# Patient Record
Sex: MALE | Race: ASIAN | NOT HISPANIC OR LATINO | URBAN - METROPOLITAN AREA
[De-identification: names, ages, dates, MRNs, and addresses within clinical notes are randomized per-mention and may not be internally consistent; named-entity substitution may affect disease eponyms.]

---
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
## 2022-06-28 ENCOUNTER — INPATIENT (INPATIENT)
Facility: HOSPITAL | Age: 22
LOS: 6 days | Discharge: HOME | End: 2022-07-05
Attending: SURGERY | Admitting: SURGERY

## 2022-06-28 VITALS
TEMPERATURE: 102 F | DIASTOLIC BLOOD PRESSURE: 68 MMHG | SYSTOLIC BLOOD PRESSURE: 115 MMHG | HEART RATE: 115 BPM | RESPIRATION RATE: 18 BRPM | OXYGEN SATURATION: 98 %

## 2022-06-28 LAB
ALBUMIN SERPL ELPH-MCNC: 4 G/DL — SIGNIFICANT CHANGE UP (ref 3.3–5)
ALP SERPL-CCNC: 42 U/L — SIGNIFICANT CHANGE UP (ref 40–120)
ALT FLD-CCNC: 30 U/L — SIGNIFICANT CHANGE UP (ref 4–41)
ANION GAP SERPL CALC-SCNC: 13 MMOL/L — SIGNIFICANT CHANGE UP (ref 7–14)
AST SERPL-CCNC: 62 U/L — HIGH (ref 4–40)
BASE EXCESS BLDV CALC-SCNC: -0.7 MMOL/L — SIGNIFICANT CHANGE UP (ref -2–3)
BASOPHILS # BLD AUTO: 0.03 K/UL — SIGNIFICANT CHANGE UP (ref 0–0.2)
BASOPHILS NFR BLD AUTO: 0.2 % — SIGNIFICANT CHANGE UP (ref 0–2)
BILIRUB SERPL-MCNC: 1.6 MG/DL — HIGH (ref 0.2–1.2)
BLOOD GAS VENOUS COMPREHENSIVE RESULT: SIGNIFICANT CHANGE UP
BUN SERPL-MCNC: 15 MG/DL — SIGNIFICANT CHANGE UP (ref 7–23)
CALCIUM SERPL-MCNC: 9.1 MG/DL — SIGNIFICANT CHANGE UP (ref 8.4–10.5)
CHLORIDE BLDV-SCNC: 98 MMOL/L — SIGNIFICANT CHANGE UP (ref 96–108)
CHLORIDE SERPL-SCNC: 95 MMOL/L — LOW (ref 98–107)
CO2 BLDV-SCNC: 24.5 MMOL/L — SIGNIFICANT CHANGE UP (ref 22–26)
CO2 SERPL-SCNC: 23 MMOL/L — SIGNIFICANT CHANGE UP (ref 22–31)
CREAT SERPL-MCNC: 1.48 MG/DL — HIGH (ref 0.5–1.3)
EGFR: 69 ML/MIN/1.73M2 — SIGNIFICANT CHANGE UP
EOSINOPHIL # BLD AUTO: 0 K/UL — SIGNIFICANT CHANGE UP (ref 0–0.5)
EOSINOPHIL NFR BLD AUTO: 0 % — SIGNIFICANT CHANGE UP (ref 0–6)
GAS PNL BLDV: 127 MMOL/L — LOW (ref 136–145)
GLUCOSE BLDV-MCNC: 105 MG/DL — HIGH (ref 70–99)
GLUCOSE SERPL-MCNC: 109 MG/DL — HIGH (ref 70–99)
HCO3 BLDV-SCNC: 23 MMOL/L — SIGNIFICANT CHANGE UP (ref 22–29)
HCT VFR BLD CALC: 41.4 % — SIGNIFICANT CHANGE UP (ref 39–50)
HCT VFR BLDA CALC: 41 % — SIGNIFICANT CHANGE UP (ref 39–51)
HGB BLD CALC-MCNC: 13.7 G/DL — SIGNIFICANT CHANGE UP (ref 13–17)
HGB BLD-MCNC: 14 G/DL — SIGNIFICANT CHANGE UP (ref 13–17)
IANC: 10.25 K/UL — HIGH (ref 1.8–7.4)
IMM GRANULOCYTES NFR BLD AUTO: 0.6 % — SIGNIFICANT CHANGE UP (ref 0–1.5)
LACTATE BLDV-MCNC: 0.8 MMOL/L — SIGNIFICANT CHANGE UP (ref 0.5–2)
LIDOCAIN IGE QN: 15 U/L — SIGNIFICANT CHANGE UP (ref 7–60)
LYMPHOCYTES # BLD AUTO: 1.01 K/UL — SIGNIFICANT CHANGE UP (ref 1–3.3)
LYMPHOCYTES # BLD AUTO: 7.9 % — LOW (ref 13–44)
MAGNESIUM SERPL-MCNC: 1.8 MG/DL — SIGNIFICANT CHANGE UP (ref 1.6–2.6)
MCHC RBC-ENTMCNC: 28.1 PG — SIGNIFICANT CHANGE UP (ref 27–34)
MCHC RBC-ENTMCNC: 33.8 GM/DL — SIGNIFICANT CHANGE UP (ref 32–36)
MCV RBC AUTO: 83 FL — SIGNIFICANT CHANGE UP (ref 80–100)
MONOCYTES # BLD AUTO: 1.39 K/UL — HIGH (ref 0–0.9)
MONOCYTES NFR BLD AUTO: 10.9 % — SIGNIFICANT CHANGE UP (ref 2–14)
NEUTROPHILS # BLD AUTO: 10.25 K/UL — HIGH (ref 1.8–7.4)
NEUTROPHILS NFR BLD AUTO: 80.4 % — HIGH (ref 43–77)
NRBC # BLD: 0 /100 WBCS — SIGNIFICANT CHANGE UP
NRBC # FLD: 0 K/UL — SIGNIFICANT CHANGE UP
PCO2 BLDV: 36 MMHG — LOW (ref 42–55)
PH BLDV: 7.42 — SIGNIFICANT CHANGE UP (ref 7.32–7.43)
PHOSPHATE SERPL-MCNC: 2.1 MG/DL — LOW (ref 2.5–4.5)
PLATELET # BLD AUTO: 190 K/UL — SIGNIFICANT CHANGE UP (ref 150–400)
PO2 BLDV: 56 MMHG — SIGNIFICANT CHANGE UP
POTASSIUM BLDV-SCNC: 3.9 MMOL/L — SIGNIFICANT CHANGE UP (ref 3.5–5.1)
POTASSIUM SERPL-MCNC: 4.1 MMOL/L — SIGNIFICANT CHANGE UP (ref 3.5–5.3)
POTASSIUM SERPL-SCNC: 4.1 MMOL/L — SIGNIFICANT CHANGE UP (ref 3.5–5.3)
PROT SERPL-MCNC: 7.6 G/DL — SIGNIFICANT CHANGE UP (ref 6–8.3)
RBC # BLD: 4.99 M/UL — SIGNIFICANT CHANGE UP (ref 4.2–5.8)
RBC # FLD: 13.3 % — SIGNIFICANT CHANGE UP (ref 10.3–14.5)
SAO2 % BLDV: 89.3 % — SIGNIFICANT CHANGE UP
SODIUM SERPL-SCNC: 131 MMOL/L — LOW (ref 135–145)
WBC # BLD: 12.76 K/UL — HIGH (ref 3.8–10.5)
WBC # FLD AUTO: 12.76 K/UL — HIGH (ref 3.8–10.5)

## 2022-06-28 PROCEDURE — 99285 EMERGENCY DEPT VISIT HI MDM: CPT

## 2022-06-28 PROCEDURE — 71045 X-RAY EXAM CHEST 1 VIEW: CPT | Mod: 26

## 2022-06-28 RX ORDER — ACETAMINOPHEN 500 MG
1000 TABLET ORAL ONCE
Refills: 0 | Status: COMPLETED | OUTPATIENT
Start: 2022-06-28 | End: 2022-06-28

## 2022-06-28 RX ORDER — SODIUM CHLORIDE 9 MG/ML
1000 INJECTION, SOLUTION INTRAVENOUS ONCE
Refills: 0 | Status: COMPLETED | OUTPATIENT
Start: 2022-06-28 | End: 2022-06-28

## 2022-06-28 RX ADMIN — SODIUM CHLORIDE 1000 MILLILITER(S): 9 INJECTION, SOLUTION INTRAVENOUS at 22:45

## 2022-06-28 RX ADMIN — Medication 400 MILLIGRAM(S): at 22:45

## 2022-06-28 NOTE — ED PROVIDER NOTE - CLINICAL SUMMARY MEDICAL DECISION MAKING FREE TEXT BOX
22M w/ no PMHx presents with 2 days of abd pain, N/V, fever/chills. Patient was weight lifting (315lbs bench press) 3 days ago when the weight breifly fell onto his upper abd for a matter of seconds prior to having help lifting the weight off. Patient had epigastric pain after leaving the gym but thought he might just be hungry so he ate and went to bed. The next AM, his pain was worsened, accompanied by N/V, chills. Patient apparently went white water rafting yesterday despite his symptoms, and today woke up feeling worse abd pain with additional episodes of N/V, fever/chills. Denies hematemesis, CP, SOB, back pain, tingling/numbness. Febrile, tachycardic, abd soft, nondistended, +diffuse abd tenderness (lower>upper), no rebound tenderness, no guarding, no rigidity. Possible abd wall trauma given history, however, patient febrile and abd exam with diffuse tenderness, so will rule out acute surgical abdomen/sepsis. Will get labs, CT, give meds, and reassess.

## 2022-06-28 NOTE — ED PROVIDER NOTE - OBJECTIVE STATEMENT
22M w/ no PMHx presents with 2 days of abd pain, N/V, fever/chills. Patient was weight lifting (315lbs bench press) 3 days ago when the weight breifly fell onto his upper abd for a matter of seconds prior to having help lifting the weight off. Patient had epigastric pain after leaving the gym but thought he might just be hungry so he ate and went to bed. The next AM, his pain was worsened, accompanied by N/V, chills. Patient apparently went white water rafting yesterday despite his symptoms, and today woke up feeling worse abd pain with additional episodes of N/V, fever/chills. Denies hematemesis, CP, SOB, back pain, tingling/numbness.

## 2022-06-28 NOTE — ED ADULT TRIAGE NOTE - CHIEF COMPLAINT QUOTE
p/.t c/o of lower abd pain with nausea and vomiting and dysuria for few days, denies sick contact denies chest pain or sob

## 2022-06-28 NOTE — ED PROVIDER NOTE - ATTENDING CONTRIBUTION TO CARE
Patient is a 23 yo M with no chronic medical problems here for evaluation of abdominal pain, vomiting and now fever. Patient reports he was lifting weights at a gym on Sunday when the 300 pound barbell he was lifting slipped onto his abdomen. He had brought it down to his chest but it slipped to his upper abdomen. He did have some pain. He reports he went home and was able to eat but the following day he continued to have pain. He thought it might be something he ate and induced vomiting but then continued to vomit throughout the day. He went to the [facility] with his family and friends yesterday and felt ill while there. He states he was shivering, continued to vomit. Upon coming to the ED today, he found out he had a fever. He also developed a cough in the past few days. He reports he is vaccinated for covid x 3. No diarrhea. No dysuria. No chest pain or shortness of breath.     VS noted  Gen. no acute distress, Non toxic   HEENT: EOMI, mmm   Lungs: CTAB/L no C/ W /R   CVS: RRR   Abd; Soft, mild ttp to RLQ, no guarding or rebound, no skin changes.   Ext: no edema  Skin: no rash  Neuro AAOx3 non focal clear speech  a/p: fever, vomiting, abd pain - concern for viral etiology, possibly appendicitis. Given vomiting, hx of barbell onto abdomen - will check CT A/P with IV contrast, labs, u/a/ flu/ covid and reasses.   - Celina WILLIS

## 2022-06-28 NOTE — ED PROVIDER NOTE - PHYSICAL EXAMINATION
GENERAL: well appearing in no acute distress, non-toxic appearing  HEAD: normocephalic, atraumatic  HEENT: normal conjunctiva, oral mucosa moist, uvula midline, no tonsilar exudates, no JVD  CARDIAC: regular rate and rhythm, normal S1S2, no appreciable murmurs, 2+ pulses in UE/LE b/l  PULM: normal breath sounds, clear to ascultation bilaterally, no rales, rhonchi, wheezing  GI: Abd soft, nondistended, +diffuse abd tenderness (lower abd >upper abd), no rebound tenderness, no guarding, no rigidity  : no CVA tenderness b/l, no suprapubic tenderness  NEURO: no focal motor or sensory deficits, normal speech, normal gait, AAOx3  MSK: ROM intact, no peripheral edema, no calf tenderness b/l  SKIN: well-perfused, extremities warm, no visible rashes  PSYCH: appropriate mood and affect

## 2022-06-28 NOTE — ED ADULT NURSE NOTE - NS ED NURSE PATIENT LEFT UNIT TIME
Problem: Skin Injury Risk Increased  Goal: Skin Health and Integrity  Outcome: Ongoing, Not Progressing  Intervention: Optimize Skin Protection  Flowsheets (Taken 5/19/2022 1749)  Pressure Reduction Techniques: frequent weight shift encouraged  Pressure Reduction Devices: positioning supports utilized  Skin Protection: incontinence pads utilized  Intervention: Promote and Optimize Oral Intake  Flowsheets (Taken 5/19/2022 1749)  Oral Nutrition Promotion:   medicated   physical activity promoted  Plan of care reviewed with patient. Patients status ongoing progressing.      08:15

## 2022-06-28 NOTE — ED ADULT NURSE NOTE - OBJECTIVE STATEMENT
Break covering RN: patient arrives to the ER A&Ox4, ambulatory, c/o right lower abdominal pain that began on monday while he was on family vacation in the [facility]. patient states he returned today to come to the ER. patient states pain is severe and he was vomiting on monday however has not since. patient is well appearing, all safety maintained at this time, abdomen soft and nondistended. patient denies any PMH. 20G IV placed in L AC, labs sent, medication given and tolerated well per EMAR, all safety maintained.

## 2022-06-29 ENCOUNTER — TRANSCRIPTION ENCOUNTER (OUTPATIENT)
Age: 22
End: 2022-06-29

## 2022-06-29 DIAGNOSIS — K35.80 UNSPECIFIED ACUTE APPENDICITIS: ICD-10-CM

## 2022-06-29 LAB
ANION GAP SERPL CALC-SCNC: 11 MMOL/L — SIGNIFICANT CHANGE UP (ref 7–14)
ANION GAP SERPL CALC-SCNC: 11 MMOL/L — SIGNIFICANT CHANGE UP (ref 7–14)
APPEARANCE UR: CLEAR — SIGNIFICANT CHANGE UP
APTT BLD: 30.9 SEC — SIGNIFICANT CHANGE UP (ref 27–36.3)
BACTERIA # UR AUTO: NEGATIVE — SIGNIFICANT CHANGE UP
BILIRUB UR-MCNC: NEGATIVE — SIGNIFICANT CHANGE UP
BLD GP AB SCN SERPL QL: NEGATIVE — SIGNIFICANT CHANGE UP
BUN SERPL-MCNC: 12 MG/DL — SIGNIFICANT CHANGE UP (ref 7–23)
BUN SERPL-MCNC: 29 MG/DL — HIGH (ref 7–23)
CALCIUM SERPL-MCNC: 8.8 MG/DL — SIGNIFICANT CHANGE UP (ref 8.4–10.5)
CALCIUM SERPL-MCNC: 9.4 MG/DL — SIGNIFICANT CHANGE UP (ref 8.4–10.5)
CHLORIDE SERPL-SCNC: 96 MMOL/L — LOW (ref 98–107)
CHLORIDE SERPL-SCNC: 98 MMOL/L — SIGNIFICANT CHANGE UP (ref 98–107)
CO2 SERPL-SCNC: 26 MMOL/L — SIGNIFICANT CHANGE UP (ref 22–31)
CO2 SERPL-SCNC: 30 MMOL/L — SIGNIFICANT CHANGE UP (ref 22–31)
COLOR SPEC: YELLOW — SIGNIFICANT CHANGE UP
CREAT SERPL-MCNC: 1.17 MG/DL — SIGNIFICANT CHANGE UP (ref 0.5–1.3)
CREAT SERPL-MCNC: 1.35 MG/DL — HIGH (ref 0.5–1.3)
DIFF PNL FLD: NEGATIVE — SIGNIFICANT CHANGE UP
EGFR: 77 ML/MIN/1.73M2 — SIGNIFICANT CHANGE UP
EGFR: 92 ML/MIN/1.73M2 — SIGNIFICANT CHANGE UP
EPI CELLS # UR: 1 /HPF — SIGNIFICANT CHANGE UP (ref 0–5)
FLUAV AG NPH QL: SIGNIFICANT CHANGE UP
FLUBV AG NPH QL: SIGNIFICANT CHANGE UP
GLUCOSE SERPL-MCNC: 103 MG/DL — HIGH (ref 70–99)
GLUCOSE SERPL-MCNC: 291 MG/DL — HIGH (ref 70–99)
GLUCOSE UR QL: NEGATIVE — SIGNIFICANT CHANGE UP
HYALINE CASTS # UR AUTO: 1 /LPF — SIGNIFICANT CHANGE UP (ref 0–7)
INR BLD: 1.38 RATIO — HIGH (ref 0.88–1.16)
KETONES UR-MCNC: ABNORMAL
LEUKOCYTE ESTERASE UR-ACNC: NEGATIVE — SIGNIFICANT CHANGE UP
MAGNESIUM SERPL-MCNC: 1.8 MG/DL — SIGNIFICANT CHANGE UP (ref 1.6–2.6)
MAGNESIUM SERPL-MCNC: 1.9 MG/DL — SIGNIFICANT CHANGE UP (ref 1.6–2.6)
NITRITE UR-MCNC: NEGATIVE — SIGNIFICANT CHANGE UP
PH UR: 6.5 — SIGNIFICANT CHANGE UP (ref 5–8)
PHOSPHATE SERPL-MCNC: 1.8 MG/DL — LOW (ref 2.5–4.5)
PHOSPHATE SERPL-MCNC: 4.1 MG/DL — SIGNIFICANT CHANGE UP (ref 2.5–4.5)
POTASSIUM SERPL-MCNC: 4 MMOL/L — SIGNIFICANT CHANGE UP (ref 3.5–5.3)
POTASSIUM SERPL-MCNC: 4.9 MMOL/L — SIGNIFICANT CHANGE UP (ref 3.5–5.3)
POTASSIUM SERPL-SCNC: 4 MMOL/L — SIGNIFICANT CHANGE UP (ref 3.5–5.3)
POTASSIUM SERPL-SCNC: 4.9 MMOL/L — SIGNIFICANT CHANGE UP (ref 3.5–5.3)
PROT UR-MCNC: ABNORMAL
PROTHROM AB SERPL-ACNC: 16.1 SEC — HIGH (ref 10.5–13.4)
RBC CASTS # UR COMP ASSIST: 2 /HPF — SIGNIFICANT CHANGE UP (ref 0–4)
RH IG SCN BLD-IMP: POSITIVE — SIGNIFICANT CHANGE UP
RSV RNA NPH QL NAA+NON-PROBE: SIGNIFICANT CHANGE UP
SARS-COV-2 RNA SPEC QL NAA+PROBE: SIGNIFICANT CHANGE UP
SODIUM SERPL-SCNC: 135 MMOL/L — SIGNIFICANT CHANGE UP (ref 135–145)
SODIUM SERPL-SCNC: 137 MMOL/L — SIGNIFICANT CHANGE UP (ref 135–145)
SP GR SPEC: 1.04 — SIGNIFICANT CHANGE UP (ref 1–1.05)
UROBILINOGEN FLD QL: ABNORMAL
WBC UR QL: 2 /HPF — SIGNIFICANT CHANGE UP (ref 0–5)

## 2022-06-29 PROCEDURE — 74177 CT ABD & PELVIS W/CONTRAST: CPT | Mod: 26,MA

## 2022-06-29 PROCEDURE — 99223 1ST HOSP IP/OBS HIGH 75: CPT | Mod: GC

## 2022-06-29 RX ORDER — MAGNESIUM SULFATE 500 MG/ML
1 VIAL (ML) INJECTION ONCE
Refills: 0 | Status: COMPLETED | OUTPATIENT
Start: 2022-06-29 | End: 2022-06-29

## 2022-06-29 RX ORDER — INFLUENZA VIRUS VACCINE 15; 15; 15; 15 UG/.5ML; UG/.5ML; UG/.5ML; UG/.5ML
0.5 SUSPENSION INTRAMUSCULAR ONCE
Refills: 0 | Status: DISCONTINUED | OUTPATIENT
Start: 2022-06-29 | End: 2022-07-05

## 2022-06-29 RX ORDER — PIPERACILLIN AND TAZOBACTAM 4; .5 G/20ML; G/20ML
INJECTION, POWDER, LYOPHILIZED, FOR SOLUTION INTRAVENOUS
Refills: 0 | Status: DISCONTINUED | OUTPATIENT
Start: 2022-06-29 | End: 2022-06-29

## 2022-06-29 RX ORDER — SODIUM CHLORIDE 9 MG/ML
1000 INJECTION, SOLUTION INTRAVENOUS ONCE
Refills: 0 | Status: COMPLETED | OUTPATIENT
Start: 2022-06-29 | End: 2022-06-29

## 2022-06-29 RX ORDER — PIPERACILLIN AND TAZOBACTAM 4; .5 G/20ML; G/20ML
3.38 INJECTION, POWDER, LYOPHILIZED, FOR SOLUTION INTRAVENOUS ONCE
Refills: 0 | Status: COMPLETED | OUTPATIENT
Start: 2022-06-29 | End: 2022-06-29

## 2022-06-29 RX ORDER — ACETAMINOPHEN 500 MG
975 TABLET ORAL EVERY 6 HOURS
Refills: 0 | Status: DISCONTINUED | OUTPATIENT
Start: 2022-06-29 | End: 2022-07-05

## 2022-06-29 RX ORDER — PIPERACILLIN AND TAZOBACTAM 4; .5 G/20ML; G/20ML
3.38 INJECTION, POWDER, LYOPHILIZED, FOR SOLUTION INTRAVENOUS EVERY 8 HOURS
Refills: 0 | Status: DISCONTINUED | OUTPATIENT
Start: 2022-06-29 | End: 2022-07-04

## 2022-06-29 RX ORDER — ENOXAPARIN SODIUM 100 MG/ML
40 INJECTION SUBCUTANEOUS EVERY 24 HOURS
Refills: 0 | Status: DISCONTINUED | OUTPATIENT
Start: 2022-06-29 | End: 2022-07-03

## 2022-06-29 RX ORDER — SODIUM CHLORIDE 9 MG/ML
1000 INJECTION, SOLUTION INTRAVENOUS
Refills: 0 | Status: DISCONTINUED | OUTPATIENT
Start: 2022-06-29 | End: 2022-06-29

## 2022-06-29 RX ORDER — HYDROMORPHONE HYDROCHLORIDE 2 MG/ML
0.5 INJECTION INTRAMUSCULAR; INTRAVENOUS; SUBCUTANEOUS EVERY 4 HOURS
Refills: 0 | Status: DISCONTINUED | OUTPATIENT
Start: 2022-06-29 | End: 2022-07-04

## 2022-06-29 RX ORDER — SODIUM CHLORIDE 9 MG/ML
1000 INJECTION, SOLUTION INTRAVENOUS
Refills: 0 | Status: DISCONTINUED | OUTPATIENT
Start: 2022-06-29 | End: 2022-07-01

## 2022-06-29 RX ORDER — PIPERACILLIN AND TAZOBACTAM 4; .5 G/20ML; G/20ML
3.38 INJECTION, POWDER, LYOPHILIZED, FOR SOLUTION INTRAVENOUS ONCE
Refills: 0 | Status: DISCONTINUED | OUTPATIENT
Start: 2022-06-29 | End: 2022-06-29

## 2022-06-29 RX ADMIN — SODIUM CHLORIDE 125 MILLILITER(S): 9 INJECTION, SOLUTION INTRAVENOUS at 06:58

## 2022-06-29 RX ADMIN — Medication 85 MILLIMOLE(S): at 18:02

## 2022-06-29 RX ADMIN — ENOXAPARIN SODIUM 40 MILLIGRAM(S): 100 INJECTION SUBCUTANEOUS at 09:27

## 2022-06-29 RX ADMIN — Medication 975 MILLIGRAM(S): at 21:14

## 2022-06-29 RX ADMIN — PIPERACILLIN AND TAZOBACTAM 200 GRAM(S): 4; .5 INJECTION, POWDER, LYOPHILIZED, FOR SOLUTION INTRAVENOUS at 05:00

## 2022-06-29 RX ADMIN — PIPERACILLIN AND TAZOBACTAM 25 GRAM(S): 4; .5 INJECTION, POWDER, LYOPHILIZED, FOR SOLUTION INTRAVENOUS at 09:29

## 2022-06-29 RX ADMIN — Medication 975 MILLIGRAM(S): at 14:54

## 2022-06-29 RX ADMIN — Medication 100 GRAM(S): at 18:03

## 2022-06-29 RX ADMIN — SODIUM CHLORIDE 1000 MILLILITER(S): 9 INJECTION, SOLUTION INTRAVENOUS at 05:00

## 2022-06-29 RX ADMIN — PIPERACILLIN AND TAZOBACTAM 25 GRAM(S): 4; .5 INJECTION, POWDER, LYOPHILIZED, FOR SOLUTION INTRAVENOUS at 21:15

## 2022-06-29 RX ADMIN — PIPERACILLIN AND TAZOBACTAM 25 GRAM(S): 4; .5 INJECTION, POWDER, LYOPHILIZED, FOR SOLUTION INTRAVENOUS at 18:00

## 2022-06-29 RX ADMIN — Medication 975 MILLIGRAM(S): at 15:24

## 2022-06-29 NOTE — H&P ADULT - ASSESSMENT
22M 4 days of lower abdominal pain, n/v and febrile in ED to 101.8 found to have acute appendicitis with 1cm appendicolith, significant pericecal inflammation    concern for possible perforated appendicitis    Plan:  -admit to Dr. Auguste  -NPO/IVF/zosyn  -ongoing surgical discussion for timing of appendectomy  -chem vte ppx  -f/u repeat labs (trend Cr)    Discussed with Dr. Molina HENSON TEAM SURGERY  [id number]

## 2022-06-29 NOTE — PATIENT PROFILE ADULT - BRAND OF COVID-19 VACCINATION
Pfizer dose 1 and 2 Pfizer dose 1, 2, and 3 Patient does not have vax card with him, does not know exact or approximate date of booster shots. Patient unable to remember at this time./Pfizer dose 1, 2, and 3

## 2022-06-29 NOTE — DISCHARGE NOTE PROVIDER - HOSPITAL COURSE
This patient is a 22M no pmh/psh who presented to [facility] ED on 6/28/22 with 4 days of RLQ/lower abdominal pain and n/v, was in the [place] and then came back here for evaluation. In Ed, Temp 101.8, tachy to 115 initially, then resolved, softly distended with lower abdominal tenderness, no rebound or guarding, wbc 12.7, cr 1.5, tbili 1.6, UA pending, CT with 1cm appendicolith and 1.8cm dilated appendix with significant fat stranding and pericecal inflammation. Patient admitted to surgery service for possible perforated appendicitis. Made NPO, started on IV fluids and IV antibiotics.        INCOMPLETE This patient is a 22M no pmh/psh who presented to [facility] ED on 6/28/22 with 4 days of RLQ/lower abdominal pain and n/v, was in the [place] and then came back here for evaluation. In Ed, Temp 101.8, tachy to 115 initially, then resolved, softly distended with lower abdominal tenderness, no rebound or guarding, wbc 12.7, cr 1.5, tbili 1.6, UA pending, CT with 1cm appendicolith and 1.8cm dilated appendix with significant fat stranding and pericecal inflammation. Patient admitted to surgery service for possible perforated appendicitis. Made NPO, started on IV fluids and IV antibiotics.    IR was consulted for drainage however there was no adequate window. Pt was monitored on abx,   During hospital course patients diet was slowly advanced as tolerated.    At this time, pt is tolerating a regular diet, ambulating and voiding.    Pt has been deemed stable for discharge at this time.

## 2022-06-29 NOTE — DISCHARGE NOTE PROVIDER - NSDCFUADDINST_GEN_ALL_CORE_FT
Please follow up with your surgeon in 1-2 weeks. At this time an interval surgery will be discussed.

## 2022-06-29 NOTE — H&P ADULT - HISTORY OF PRESENT ILLNESS
General Surgery Consult  Consulting surgical team: B TEAM SURGERY  Consulting attending: Dr. Auguste    HPI:  22M no pmh/psh here with 4 days of RLQ/lower abdominal pain and n/v, was in the [place] and then came back here for evaluation. In Ed, Temp 101.8, tachy to 115 initially, now normal vitals, softly distended with lower abdominal tenderness, no rebound or guarding, wbc 12.7, cr 1.5, tbili 1.6, UA pending, CT with 1cm appendicolith and 1.8cm dilated appendix with significant fat stranding and pericecal inflammation.    denies tob or drug use  never had colonoscopy  uncle with Chron's  social etoh    PAST MEDICAL HISTORY:  No pertinent past medical history        PAST SURGICAL HISTORY:  No significant past surgical history        MEDICATIONS:  denies    ALLERGIES:  No Known Allergies      VITALS & I/Os:  Vital Signs Last 24 Hrs  T(C): 36.5 (29 Jun 2022 02:09), Max: 38.8 (28 Jun 2022 20:00)  T(F): 97.7 (29 Jun 2022 02:09), Max: 101.8 (28 Jun 2022 20:00)  HR: 85 (29 Jun 2022 02:09) (85 - 115)  BP: 131/68 (29 Jun 2022 02:09) (115/68 - 131/68)  BP(mean): --  RR: 18 (29 Jun 2022 02:09) (18 - 18)  SpO2: 100% (29 Jun 2022 02:09) (98% - 100%)    I&O's Summary      PHYSICAL EXAM:  General: No acute distress  Respiratory: Nonlabored  Cardiovascular: RRR  Abdominal: Soft, nondistended, RLQ>LLQ ttp. No rebound or guarding. No organomegaly, no palpable mass.  Extremities: Warm    LABS:                        14.0   12.76 )-----------( 190      ( 28 Jun 2022 22:56 )             41.4     06-28    131<L>  |  95<L>  |  15  ----------------------------<  109<H>  4.1   |  23  |  1.48<H>    Ca    9.1      28 Jun 2022 22:56  Phos  2.1     06-28  Mg     1.80     06-28    TPro  7.6  /  Alb  4.0  /  TBili  1.6<H>  /  DBili  x   /  AST  62<H>  /  ALT  30  /  AlkPhos  42  06-28    Lactate:  06-28 @ 22:56  0.8    PT/INR - ( 29 Jun 2022 04:37 )   PT: 16.1 sec;   INR: 1.38 ratio         PTT - ( 29 Jun 2022 04:37 )  PTT:30.9 sec              IMAGING:    ACC: [id number] EXAM:  CT ABDOMEN AND PELVIS IC                          PROCEDURE DATE:  06/29/2022          INTERPRETATION:  CLINICAL INFORMATION: Abdominal pain, nausea and   vomiting and fever    COMPARISON: None.    CONTRAST/COMPLICATIONS:  IV Contrast: Omnipaque 350  63 cc administered   7 cc discarded  Oral Contrast: NONE  Complications: None reported at time of study completion    PROCEDURE:  CT of the Abdomen and Pelvis was performed.  Sagittal and coronal reformats were performed.    FINDINGS:  LOWER CHEST: Within normal limits.    LIVER: Within normal limits.  BILE DUCTS: Normal caliber.  GALLBLADDER: Within normal limits.  SPLEEN: Within normal limits.  PANCREAS: Within normal limits.  ADRENALS: Within normal limits.  KIDNEYS/URETERS: Within normal limits.    BLADDER: Within normal limits.  REPRODUCTIVE ORGANS: Prostate within normal limits.    BOWEL: The appendix is is markedly dilated up to 1.8 cm and a 1.0 cm   appendicolith is present at its base. There is extensive pericecal edema   and fat stranding.  No bowel obstruction.  PERITONEUM: Trace free fluid  VESSELS: Within normal limits.  RETROPERITONEUM/LYMPH NODES: No lymphadenopathy.  ABDOMINAL WALL: Within normal limits.  BONES: Within normal limits.    IMPRESSION:  Acute appendicitis with appendicolith and significant pericecal   inflammation. No free air or evidence of abscess.    --- End of Report ---          ALEJANDRO OVERTON MD; Resident Radiology  This document has been electronically signed.  JUJU NIEVES MD; Attending Radiologist  This document has been electronically signed. Jun 29 2022  4:13AM

## 2022-06-29 NOTE — DISCHARGE NOTE PROVIDER - PROVIDER TOKENS
PROVIDER:[TOKEN:[[id number]],FOLLOWUP:[2 weeks]] PROVIDER:[TOKEN:[[id number]],FOLLOWUP:[2 weeks]]

## 2022-06-29 NOTE — DISCHARGE NOTE PROVIDER - CARE PROVIDERS DIRECT ADDRESSES
[email or web address]

## 2022-06-29 NOTE — DISCHARGE NOTE PROVIDER - CARE PROVIDER_API CALL
Sebastien Auguste)  Surgery  [phone] [place] [address]  Phone: [phone]  Fax: [phone]  Follow Up Time: 2 weeks   Flakita Bernabe)  Surgery; Surgical Critical Care  [phone] [place] [address]  Phone: [phone]  Fax: [phone]  Follow Up Time: 2 weeks

## 2022-06-29 NOTE — PATIENT PROFILE ADULT - FOOD INSECURITY
Pt states that he had more than one seizure this morning. EMS states patient was on the phone with wife and then started to have about a 2min seizure. pt states he has a headache, did not take his blood pressure medication and also has stomach pain. pt states he was at [facility] yesterday and was given an antiacid but states he still has a lot of stomach pain and points to epigastric area and states pain is there. no

## 2022-06-29 NOTE — H&P ADULT - ATTENDING COMMENTS
Pt seen and examined.  Agree with resident eval and plan.  Imp:  Acute perforated appendicitis with localized inflammation.  NPO, IV antibiotics.  If clinically improves in 24 hours will start clears.

## 2022-06-29 NOTE — DISCHARGE NOTE PROVIDER - NSDCMRMEDTOKEN_GEN_ALL_CORE_FT
acetaminophen 325 mg oral tablet: 3 tab(s) orally every 6 hours, As needed, Temp greater or equal to 38C (100.4F), Mild Pain (1 - 3)  amoxicillin-clavulanate 875 mg-125 mg oral tablet: 1 tab(s) orally every 12 hours  ibuprofen 600 mg oral tablet: 1 tab(s) orally every 6 hours, As needed, Moderate Pain (4 - 6)

## 2022-06-30 LAB
ANION GAP SERPL CALC-SCNC: 13 MMOL/L — SIGNIFICANT CHANGE UP (ref 7–14)
BUN SERPL-MCNC: 9 MG/DL — SIGNIFICANT CHANGE UP (ref 7–23)
CALCIUM SERPL-MCNC: 8.3 MG/DL — LOW (ref 8.4–10.5)
CHLORIDE SERPL-SCNC: 99 MMOL/L — SIGNIFICANT CHANGE UP (ref 98–107)
CO2 SERPL-SCNC: 21 MMOL/L — LOW (ref 22–31)
CREAT SERPL-MCNC: 1.14 MG/DL — SIGNIFICANT CHANGE UP (ref 0.5–1.3)
CULTURE RESULTS: SIGNIFICANT CHANGE UP
EGFR: 94 ML/MIN/1.73M2 — SIGNIFICANT CHANGE UP
GLUCOSE SERPL-MCNC: 121 MG/DL — HIGH (ref 70–99)
HCT VFR BLD CALC: 38.2 % — LOW (ref 39–50)
HGB BLD-MCNC: 13 G/DL — SIGNIFICANT CHANGE UP (ref 13–17)
MAGNESIUM SERPL-MCNC: 2 MG/DL — SIGNIFICANT CHANGE UP (ref 1.6–2.6)
MCHC RBC-ENTMCNC: 28 PG — SIGNIFICANT CHANGE UP (ref 27–34)
MCHC RBC-ENTMCNC: 34 GM/DL — SIGNIFICANT CHANGE UP (ref 32–36)
MCV RBC AUTO: 82.3 FL — SIGNIFICANT CHANGE UP (ref 80–100)
NRBC # BLD: 0 /100 WBCS — SIGNIFICANT CHANGE UP
NRBC # FLD: 0 K/UL — SIGNIFICANT CHANGE UP
PHOSPHATE SERPL-MCNC: 2.3 MG/DL — LOW (ref 2.5–4.5)
PLATELET # BLD AUTO: 184 K/UL — SIGNIFICANT CHANGE UP (ref 150–400)
POTASSIUM SERPL-MCNC: 3.7 MMOL/L — SIGNIFICANT CHANGE UP (ref 3.5–5.3)
POTASSIUM SERPL-SCNC: 3.7 MMOL/L — SIGNIFICANT CHANGE UP (ref 3.5–5.3)
RBC # BLD: 4.64 M/UL — SIGNIFICANT CHANGE UP (ref 4.2–5.8)
RBC # FLD: 13.5 % — SIGNIFICANT CHANGE UP (ref 10.3–14.5)
SODIUM SERPL-SCNC: 133 MMOL/L — LOW (ref 135–145)
SPECIMEN SOURCE: SIGNIFICANT CHANGE UP
WBC # BLD: 9.38 K/UL — SIGNIFICANT CHANGE UP (ref 3.8–10.5)
WBC # FLD AUTO: 9.38 K/UL — SIGNIFICANT CHANGE UP (ref 3.8–10.5)

## 2022-06-30 RX ORDER — SODIUM,POTASSIUM PHOSPHATES 278-250MG
1 POWDER IN PACKET (EA) ORAL ONCE
Refills: 0 | Status: COMPLETED | OUTPATIENT
Start: 2022-06-30 | End: 2022-06-30

## 2022-06-30 RX ORDER — KETOROLAC TROMETHAMINE 30 MG/ML
30 SYRINGE (ML) INJECTION EVERY 6 HOURS
Refills: 0 | Status: DISCONTINUED | OUTPATIENT
Start: 2022-06-30 | End: 2022-06-30

## 2022-06-30 RX ORDER — POTASSIUM CHLORIDE 20 MEQ
40 PACKET (EA) ORAL ONCE
Refills: 0 | Status: COMPLETED | OUTPATIENT
Start: 2022-06-30 | End: 2022-06-30

## 2022-06-30 RX ADMIN — ENOXAPARIN SODIUM 40 MILLIGRAM(S): 100 INJECTION SUBCUTANEOUS at 09:22

## 2022-06-30 RX ADMIN — PIPERACILLIN AND TAZOBACTAM 25 GRAM(S): 4; .5 INJECTION, POWDER, LYOPHILIZED, FOR SOLUTION INTRAVENOUS at 22:00

## 2022-06-30 RX ADMIN — PIPERACILLIN AND TAZOBACTAM 25 GRAM(S): 4; .5 INJECTION, POWDER, LYOPHILIZED, FOR SOLUTION INTRAVENOUS at 05:23

## 2022-06-30 RX ADMIN — Medication 1 TABLET(S): at 09:29

## 2022-06-30 RX ADMIN — Medication 975 MILLIGRAM(S): at 10:21

## 2022-06-30 RX ADMIN — Medication 975 MILLIGRAM(S): at 19:09

## 2022-06-30 RX ADMIN — PIPERACILLIN AND TAZOBACTAM 25 GRAM(S): 4; .5 INJECTION, POWDER, LYOPHILIZED, FOR SOLUTION INTRAVENOUS at 13:42

## 2022-06-30 RX ADMIN — Medication 975 MILLIGRAM(S): at 09:21

## 2022-06-30 RX ADMIN — Medication 40 MILLIEQUIVALENT(S): at 09:21

## 2022-06-30 NOTE — PROGRESS NOTE ADULT - ASSESSMENT
22M 4 days of lower abdominal pain, n/v and febrile in ED to 101.8 found to have acute appendicitis with 1cm appendicolith, significant pericecal inflammation    concern for possible perforated appendicitis    Plan:  -NPO/IVF/zosyn  -ongoing surgical discussion for timing of appendectomy  -chem vte ppx  -f/u repeat labs (trend Cr)      B Team Surgery  [id number] Assessment: 22M 4 days of lower abdominal pain, n/v and febrile in ED to 101.8 found to have acute appendicitis with 1cm appendicolith, significant pericecal inflammation.     Plan:  -NPO/IVF/zosyn  -ongoing surgical discussion for timing of appendectomy  -chem vte ppx  -f/u creatinine     B Team Surgery  [id number]

## 2022-06-30 NOTE — PROGRESS NOTE ADULT - SUBJECTIVE AND OBJECTIVE BOX
Surgery Progress Note     Subjective/24hour Events:   Patient seen and examined.       Vital Signs:  Vital Signs Last 24 Hrs  T(C): 38.3 (29 Jun 2022 23:09), Max: 38.8 (29 Jun 2022 14:42)  T(F): 100.9 (29 Jun 2022 23:09), Max: 101.8 (29 Jun 2022 14:42)  HR: 85 (29 Jun 2022 20:55) (77 - 108)  BP: 148/95 (29 Jun 2022 20:55) (128/78 - 148/95)  BP(mean): --  RR: 19 (29 Jun 2022 23:09) (18 - 19)  SpO2: 93% (29 Jun 2022 23:09) (89% - 100%)    CAPILLARY BLOOD GLUCOSE          I&O's Detail    29 Jun 2022 07:01  -  30 Jun 2022 01:29  --------------------------------------------------------  IN:    dextrose 5% + sodium chloride 0.9%: 360 mL    IV PiggyBack: 950 mL    Lactated Ringers: 1000 mL    Oral Fluid: 30 mL  Total IN: 2340 mL    OUT:    Stool (mL): 1 mL    Voided (mL): 275 mL  Total OUT: 276 mL    Total NET: 2064 mL            Physical Exam:  General: No acute distress  Respiratory: Nonlabored  Cardiovascular: RRR  Abdominal: Soft, nondistended, RLQ>LLQ ttp. No rebound or guarding. No organomegaly, no palpable mass.  Extremities: Warm      Labs:    06-29    135  |  98  |  12  ----------------------------<  103<H>  4.0   |  26  |  1.17    Ca    8.8      29 Jun 2022 14:32  Phos  1.8     06-29  Mg     1.80     06-29    TPro  7.6  /  Alb  4.0  /  TBili  1.6<H>  /  DBili  x   /  AST  62<H>  /  ALT  30  /  AlkPhos  42  06-28    LIVER FUNCTIONS - ( 28 Jun 2022 22:56 )  Alb: 4.0 g/dL / Pro: 7.6 g/dL / ALK PHOS: 42 U/L / ALT: 30 U/L / AST: 62 U/L / GGT: x                                 14.0   12.76 )-----------( 190      ( 28 Jun 2022 22:56 )             41.4     PT/INR - ( 29 Jun 2022 04:37 )   PT: 16.1 sec;   INR: 1.38 ratio         PTT - ( 29 Jun 2022 04:37 )  PTT:30.9 sec     Surgery Progress Note     Subjective/24hour Events:   Patient seen and examined. Continues to have loose stools overnight, pain controlled with current regiment. Febrile overnight to 100.9,     Vital Signs Last 24 Hrs  T(C): 37.7 (30 Jun 2022 05:40), Max: 38.8 (29 Jun 2022 14:42)  T(F): 99.8 (30 Jun 2022 05:40), Max: 101.8 (29 Jun 2022 14:42)  HR: 90 (30 Jun 2022 05:40) (85 - 108)  BP: 133/76 (30 Jun 2022 05:40) (125/71 - 148/95)  BP(mean): --  RR: 16 (30 Jun 2022 05:40) (16 - 19)  SpO2: 100% (30 Jun 2022 05:40) (89% - 100%)    I&O's Detail    29 Jun 2022 07:01  -  30 Jun 2022 07:00  --------------------------------------------------------  IN:    dextrose 5% + sodium chloride 0.9%: 1320 mL    IV PiggyBack: 1050 mL    Lactated Ringers: 1000 mL    Oral Fluid: 30 mL  Total IN: 3400 mL    OUT:    Stool (mL): 1 mL    Voided (mL): 675 mL  Total OUT: 676 mL    Total NET: 2724 mL    Physical Exam:  General: No acute distress  Respiratory: Nonlabored  Cardiovascular: appears well perfused  Abdominal: Soft, nondistended, RLQ>LLQ ttp. No rebound or guarding. No organomegaly, no palpable mass.  Extremities: Warm      Labs:                          13.0   9.38  )-----------( 184      ( 30 Jun 2022 07:05 )             38.2   06-30    133<L>  |  99  |  9   ----------------------------<  121<H>  3.7   |  21<L>  |  1.14    Ca    8.3<L>      30 Jun 2022 07:05  Phos  1.8     06-29  Mg     2.00     06-30    TPro  7.6  /  Alb  4.0  /  TBili  1.6<H>  /  DBili  x   /  AST  62<H>  /  ALT  30  /  AlkPhos  42  06-28

## 2022-07-01 LAB
ANION GAP SERPL CALC-SCNC: 11 MMOL/L — SIGNIFICANT CHANGE UP (ref 7–14)
BLD GP AB SCN SERPL QL: NEGATIVE — SIGNIFICANT CHANGE UP
BUN SERPL-MCNC: 6 MG/DL — LOW (ref 7–23)
CALCIUM SERPL-MCNC: 8.5 MG/DL — SIGNIFICANT CHANGE UP (ref 8.4–10.5)
CHLORIDE SERPL-SCNC: 101 MMOL/L — SIGNIFICANT CHANGE UP (ref 98–107)
CO2 SERPL-SCNC: 23 MMOL/L — SIGNIFICANT CHANGE UP (ref 22–31)
CREAT SERPL-MCNC: 1.01 MG/DL — SIGNIFICANT CHANGE UP (ref 0.5–1.3)
EGFR: 109 ML/MIN/1.73M2 — SIGNIFICANT CHANGE UP
GLUCOSE SERPL-MCNC: 123 MG/DL — HIGH (ref 70–99)
HCT VFR BLD CALC: 35.1 % — LOW (ref 39–50)
HGB BLD-MCNC: 12 G/DL — LOW (ref 13–17)
MAGNESIUM SERPL-MCNC: 1.7 MG/DL — SIGNIFICANT CHANGE UP (ref 1.6–2.6)
MCHC RBC-ENTMCNC: 27.6 PG — SIGNIFICANT CHANGE UP (ref 27–34)
MCHC RBC-ENTMCNC: 34.2 GM/DL — SIGNIFICANT CHANGE UP (ref 32–36)
MCV RBC AUTO: 80.7 FL — SIGNIFICANT CHANGE UP (ref 80–100)
NRBC # BLD: 0 /100 WBCS — SIGNIFICANT CHANGE UP
NRBC # FLD: 0 K/UL — SIGNIFICANT CHANGE UP
PHOSPHATE SERPL-MCNC: 2.5 MG/DL — SIGNIFICANT CHANGE UP (ref 2.5–4.5)
PLATELET # BLD AUTO: 191 K/UL — SIGNIFICANT CHANGE UP (ref 150–400)
POTASSIUM SERPL-MCNC: 3.6 MMOL/L — SIGNIFICANT CHANGE UP (ref 3.5–5.3)
POTASSIUM SERPL-SCNC: 3.6 MMOL/L — SIGNIFICANT CHANGE UP (ref 3.5–5.3)
RBC # BLD: 4.35 M/UL — SIGNIFICANT CHANGE UP (ref 4.2–5.8)
RBC # FLD: 13.6 % — SIGNIFICANT CHANGE UP (ref 10.3–14.5)
RH IG SCN BLD-IMP: POSITIVE — SIGNIFICANT CHANGE UP
SARS-COV-2 RNA SPEC QL NAA+PROBE: SIGNIFICANT CHANGE UP
SODIUM SERPL-SCNC: 135 MMOL/L — SIGNIFICANT CHANGE UP (ref 135–145)
WBC # BLD: 10.1 K/UL — SIGNIFICANT CHANGE UP (ref 3.8–10.5)
WBC # FLD AUTO: 10.1 K/UL — SIGNIFICANT CHANGE UP (ref 3.8–10.5)

## 2022-07-01 RX ORDER — SODIUM,POTASSIUM PHOSPHATES 278-250MG
2 POWDER IN PACKET (EA) ORAL ONCE
Refills: 0 | Status: COMPLETED | OUTPATIENT
Start: 2022-07-01 | End: 2022-07-01

## 2022-07-01 RX ORDER — MAGNESIUM SULFATE 500 MG/ML
1 VIAL (ML) INJECTION ONCE
Refills: 0 | Status: COMPLETED | OUTPATIENT
Start: 2022-07-01 | End: 2022-07-01

## 2022-07-01 RX ORDER — SODIUM CHLORIDE 9 MG/ML
1000 INJECTION, SOLUTION INTRAVENOUS
Refills: 0 | Status: DISCONTINUED | OUTPATIENT
Start: 2022-07-01 | End: 2022-07-02

## 2022-07-01 RX ADMIN — Medication 2 PACKET(S): at 10:57

## 2022-07-01 RX ADMIN — Medication 975 MILLIGRAM(S): at 22:44

## 2022-07-01 RX ADMIN — PIPERACILLIN AND TAZOBACTAM 25 GRAM(S): 4; .5 INJECTION, POWDER, LYOPHILIZED, FOR SOLUTION INTRAVENOUS at 05:21

## 2022-07-01 RX ADMIN — Medication 975 MILLIGRAM(S): at 05:51

## 2022-07-01 RX ADMIN — Medication 100 GRAM(S): at 10:56

## 2022-07-01 RX ADMIN — PIPERACILLIN AND TAZOBACTAM 25 GRAM(S): 4; .5 INJECTION, POWDER, LYOPHILIZED, FOR SOLUTION INTRAVENOUS at 13:24

## 2022-07-01 RX ADMIN — PIPERACILLIN AND TAZOBACTAM 25 GRAM(S): 4; .5 INJECTION, POWDER, LYOPHILIZED, FOR SOLUTION INTRAVENOUS at 21:10

## 2022-07-01 RX ADMIN — Medication 975 MILLIGRAM(S): at 23:14

## 2022-07-01 RX ADMIN — ENOXAPARIN SODIUM 40 MILLIGRAM(S): 100 INJECTION SUBCUTANEOUS at 09:27

## 2022-07-01 RX ADMIN — Medication 975 MILLIGRAM(S): at 05:21

## 2022-07-01 NOTE — PROGRESS NOTE ADULT - ASSESSMENT
22M 4 days of lower abdominal pain, n/v and febrile in ED to 101.8 found to have acute appendicitis with 1cm appendicolith, significant pericecal inflammation.     Plan:      B Team Surgery  [id number] 22M 4 days of lower abdominal pain, n/v and febrile in ED to 101.8 found to have acute appendicitis with 1cm appendicolith, significant pericecal inflammation. Pt experienced fever to tmax 101.9 overnight. Pain is improving    Plan:  - low fiber diet  - Zosyn  - ongoing surgical discussion for timing of appendectomy, NPO at midnight  - chem vte ppx  - monitor vital signs, I&O's, AM labs  - replace electrolytes as needed  - Pain control with PRN IV toradol 30mg for moderate pain and dilaudid 0.5mg for severe pain    B Team Surgery  [id number]

## 2022-07-01 NOTE — PROGRESS NOTE ADULT - SUBJECTIVE AND OBJECTIVE BOX
Surgery Progress Note     Subjective/24hour Events:   Patient seen and examined.       Vital Signs:  Vital Signs Last 24 Hrs  T(C): 36.7 (01 Jul 2022 01:45), Max: 38.8 (30 Jun 2022 17:23)  T(F): 98.1 (01 Jul 2022 01:45), Max: 101.9 (30 Jun 2022 17:23)  HR: 94 (01 Jul 2022 01:45) (88 - 103)  BP: 130/71 (01 Jul 2022 01:45) (120/73 - 134/72)  BP(mean): --  RR: 18 (01 Jul 2022 01:45) (16 - 18)  SpO2: 98% (01 Jul 2022 01:45) (97% - 100%)    CAPILLARY BLOOD GLUCOSE          I&O's Detail    29 Jun 2022 07:01  -  30 Jun 2022 07:00  --------------------------------------------------------  IN:    dextrose 5% + sodium chloride 0.9%: 1320 mL    IV PiggyBack: 1050 mL    Lactated Ringers: 1000 mL    Oral Fluid: 30 mL  Total IN: 3400 mL    OUT:    Stool (mL): 1 mL    Voided (mL): 675 mL  Total OUT: 676 mL    Total NET: 2724 mL      30 Jun 2022 07:01  -  01 Jul 2022 02:04  --------------------------------------------------------  IN:    dextrose 5% + sodium chloride 0.9%: 480 mL    Oral Fluid: 240 mL  Total IN: 720 mL    OUT:    Voided (mL): 300 mL  Total OUT: 300 mL    Total NET: 420 mL            Physical Exam:  Gen:  CV:  Resp:  Abd:  Ext:      Labs:    06-30    133<L>  |  99  |  9   ----------------------------<  121<H>  3.7   |  21<L>  |  1.14    Ca    8.3<L>      30 Jun 2022 07:05  Phos  2.3     06-30  Mg     2.00     06-30                              13.0   9.38  )-----------( 184      ( 30 Jun 2022 07:05 )             38.2     PT/INR - ( 29 Jun 2022 04:37 )   PT: 16.1 sec;   INR: 1.38 ratio         PTT - ( 29 Jun 2022 04:37 )  PTT:30.9 sec     Surgery Progress Note    STATUS POST: non-operative management for acute appendicitis with appendicolith.   SUBJECTIVE: PARUL HOOKS is a 22y male who presents with day 2 of his hospitalization for non-operative management for acute appendicitis with a 1cm appendicolith. Pt fever peaked to 101.9 overnight. Currently on clear liquid diet, complains of ongoing watery diarrhea.     Vital Signs Last 24 Hrs  T(C): 37.7 (01 Jul 2022 05:09), Max: 38.8 (30 Jun 2022 17:23)  T(F): 99.8 (01 Jul 2022 05:09), Max: 101.9 (30 Jun 2022 17:23)  HR: 95 (01 Jul 2022 05:09) (88 - 103)  BP: 112/69 (01 Jul 2022 05:09) (112/69 - 134/72)  BP(mean): --  RR: 18 (01 Jul 2022 05:09) (18 - 18)  SpO2: 98% (01 Jul 2022 05:09) (97% - 100%)        General Appearance: NAD, patient appears mildly uncomfortable  Neck: Supple  Chest: Equal expansion bilaterally, equal breath sounds  CV: Pulse regular presently  Abdomen: soft, nondistended. RLQ ttp, no rebound or guarding. No palapble mass  Extremities: Grossly symmetric, warm     Pain: [x] YES [ ] NO         Pain Control Adequate: [x] YES [ ] NO  SOB: [ ]YES [x] NO  Chest Discomfort: [ ] YES [x]  NO    Nausea: [ ] YES [x] NO           Vomiting: [ ] YES [x] NO  Flatus: [x]  YES [ ] NO             Bowel Movement: [x]  YES [ ] NO     Void: [x] YES [ ]No    Posadas: No  NGT: No  PADMAJA: No    I&O's Summary    30 Jun 2022 07:01  -  01 Jul 2022 07:00  --------------------------------------------------------  IN: 2160 mL / OUT: 900 mL / NET: 1260 mL      I&O's Detail    30 Jun 2022 07:01  -  01 Jul 2022 07:00  --------------------------------------------------------  IN:    dextrose 5% + sodium chloride 0.9%: 1440 mL    Oral Fluid: 720 mL  Total IN: 2160 mL    OUT:    Voided (mL): 900 mL  Total OUT: 900 mL    Total NET: 1260 mL      MEDICATIONS  (STANDING):  dextrose 5% + lactated ringers. 1000 milliLiter(s) (100 mL/Hr) IV Continuous <Continuous>  dextrose 5% + sodium chloride 0.9%. 1000 milliLiter(s) (120 mL/Hr) IV Continuous <Continuous>  enoxaparin Injectable 40 milliGRAM(s) SubCutaneous every 24 hours  influenza   Vaccine 0.5 milliLiter(s) IntraMuscular once  magnesium sulfate  IVPB 1 Gram(s) IV Intermittent once  piperacillin/tazobactam IVPB.. 3.375 Gram(s) IV Intermittent every 8 hours  potassium phosphate / sodium phosphate Powder (PHOS-NaK) 2 Packet(s) Oral once    MEDICATIONS  (PRN):  acetaminophen     Tablet .. 975 milliGRAM(s) Oral every 6 hours PRN Temp greater or equal to 38C (100.4F), Mild Pain (1 - 3)  HYDROmorphone  Injectable 0.5 milliGRAM(s) IV Push every 4 hours PRN Severe Pain (7 - 10)  ketorolac   Injectable 30 milliGRAM(s) IV Push every 6 hours PRN Moderate Pain (4 - 6)      LABS:                        12.0   10.10 )-----------( 191      ( 01 Jul 2022 06:19 )             35.1     07-01    135  |  101  |  6<L>  ----------------------------<  123<H>  3.6   |  23  |  1.01    Ca    8.5      01 Jul 2022 06:19  Phos  2.5     07-01  Mg     1.70     07-01      RADIOLOGY & ADDITIONAL STUDIES:   No new imaging.     ASSESSMENT & PLAN   Assessment: 22M 4 days of lower abdominal pain, n/v and febrile in ED to 101.9 found to have acute appendicitis with 1cm appendicolith, significant pericecal inflammation. Fever peaked to Tmax 101.9 overnight.     Plan:  -Full diet as tolerated/IVF/zosyn  -ongoing surgical discussion for timing of appendectomy, made NPO at midnight  -chem vte ppx  -f/u creatinine     B Team Surgery  [id number]     Surgery Progress Note    STATUS POST: non-operative management for acute appendicitis with appendicolith.   SUBJECTIVE: PARUL HOOKS is a 22y male who presents with day 2 of his hospitalization for non-operative management for acute appendicitis with a 1cm appendicolith. Pt fever peaked to 101.9 overnight, no other acute events overnight. Currently on clear liquid diet, complains of ongoing watery diarrhea.     Vital Signs Last 24 Hrs  T(C): 37.7 (01 Jul 2022 05:09), Max: 38.8 (30 Jun 2022 17:23)  T(F): 99.8 (01 Jul 2022 05:09), Max: 101.9 (30 Jun 2022 17:23)  HR: 95 (01 Jul 2022 05:09) (88 - 103)  BP: 112/69 (01 Jul 2022 05:09) (112/69 - 134/72)  BP(mean): --  RR: 18 (01 Jul 2022 05:09) (18 - 18)  SpO2: 98% (01 Jul 2022 05:09) (97% - 100%)      General Appearance: NAD, patient appears mildly uncomfortable  Neck: Supple  Chest: Equal expansion bilaterally, equal breath sounds  CV: Pulse regular presently  Abdomen: soft, nondistended. RLQ ttp, no rebound or guarding. No palapble mass  Extremities: Grossly symmetric, warm     Pain: [x] YES [ ] NO         Pain Control Adequate: [x] YES [ ] NO  SOB: [ ]YES [x] NO  Chest Discomfort: [ ] YES [x]  NO    Nausea: [ ] YES [x] NO           Vomiting: [ ] YES [x] NO  Flatus: [x]  YES [ ] NO             Bowel Movement: [x]  YES [ ] NO     Void: [x] YES [ ]No    Posadas: No  NGT: No  PADMAJA: No    I&O's Summary    30 Jun 2022 07:01  -  01 Jul 2022 07:00  --------------------------------------------------------  IN: 2160 mL / OUT: 900 mL / NET: 1260 mL      I&O's Detail    30 Jun 2022 07:01  -  01 Jul 2022 07:00  --------------------------------------------------------  IN:    dextrose 5% + sodium chloride 0.9%: 1440 mL    Oral Fluid: 720 mL  Total IN: 2160 mL    OUT:    Voided (mL): 900 mL  Total OUT: 900 mL    Total NET: 1260 mL      MEDICATIONS  (STANDING):  dextrose 5% + lactated ringers. 1000 milliLiter(s) (100 mL/Hr) IV Continuous <Continuous>  dextrose 5% + sodium chloride 0.9%. 1000 milliLiter(s) (120 mL/Hr) IV Continuous <Continuous>  enoxaparin Injectable 40 milliGRAM(s) SubCutaneous every 24 hours  influenza   Vaccine 0.5 milliLiter(s) IntraMuscular once  magnesium sulfate  IVPB 1 Gram(s) IV Intermittent once  piperacillin/tazobactam IVPB.. 3.375 Gram(s) IV Intermittent every 8 hours  potassium phosphate / sodium phosphate Powder (PHOS-NaK) 2 Packet(s) Oral once    MEDICATIONS  (PRN):  acetaminophen     Tablet .. 975 milliGRAM(s) Oral every 6 hours PRN Temp greater or equal to 38C (100.4F), Mild Pain (1 - 3)  HYDROmorphone  Injectable 0.5 milliGRAM(s) IV Push every 4 hours PRN Severe Pain (7 - 10)  ketorolac   Injectable 30 milliGRAM(s) IV Push every 6 hours PRN Moderate Pain (4 - 6)      LABS:                        12.0   10.10 )-----------( 191      ( 01 Jul 2022 06:19 )             35.1     07-01    135  |  101  |  6<L>  ----------------------------<  123<H>  3.6   |  23  |  1.01    Ca    8.5      01 Jul 2022 06:19  Phos  2.5     07-01  Mg     1.70     07-01        RADIOLOGY & ADDITIONAL STUDIES:   No new imaging.

## 2022-07-02 LAB
ANION GAP SERPL CALC-SCNC: 13 MMOL/L — SIGNIFICANT CHANGE UP (ref 7–14)
APTT BLD: 30.5 SEC — SIGNIFICANT CHANGE UP (ref 27–36.3)
BUN SERPL-MCNC: 7 MG/DL — SIGNIFICANT CHANGE UP (ref 7–23)
CALCIUM SERPL-MCNC: 8.7 MG/DL — SIGNIFICANT CHANGE UP (ref 8.4–10.5)
CHLORIDE SERPL-SCNC: 98 MMOL/L — SIGNIFICANT CHANGE UP (ref 98–107)
CO2 SERPL-SCNC: 24 MMOL/L — SIGNIFICANT CHANGE UP (ref 22–31)
CREAT SERPL-MCNC: 0.96 MG/DL — SIGNIFICANT CHANGE UP (ref 0.5–1.3)
EGFR: 116 ML/MIN/1.73M2 — SIGNIFICANT CHANGE UP
GLUCOSE SERPL-MCNC: 114 MG/DL — HIGH (ref 70–99)
HCT VFR BLD CALC: 35.2 % — LOW (ref 39–50)
HGB BLD-MCNC: 12 G/DL — LOW (ref 13–17)
INR BLD: 1.26 RATIO — HIGH (ref 0.88–1.16)
MAGNESIUM SERPL-MCNC: 2 MG/DL — SIGNIFICANT CHANGE UP (ref 1.6–2.6)
MCHC RBC-ENTMCNC: 27.1 PG — SIGNIFICANT CHANGE UP (ref 27–34)
MCHC RBC-ENTMCNC: 34.1 GM/DL — SIGNIFICANT CHANGE UP (ref 32–36)
MCV RBC AUTO: 79.6 FL — LOW (ref 80–100)
NRBC # BLD: 0 /100 WBCS — SIGNIFICANT CHANGE UP
NRBC # FLD: 0 K/UL — SIGNIFICANT CHANGE UP
PHOSPHATE SERPL-MCNC: 4.2 MG/DL — SIGNIFICANT CHANGE UP (ref 2.5–4.5)
PLATELET # BLD AUTO: 215 K/UL — SIGNIFICANT CHANGE UP (ref 150–400)
POTASSIUM SERPL-MCNC: 3.6 MMOL/L — SIGNIFICANT CHANGE UP (ref 3.5–5.3)
POTASSIUM SERPL-SCNC: 3.6 MMOL/L — SIGNIFICANT CHANGE UP (ref 3.5–5.3)
PROTHROM AB SERPL-ACNC: 14.7 SEC — HIGH (ref 10.5–13.4)
RBC # BLD: 4.42 M/UL — SIGNIFICANT CHANGE UP (ref 4.2–5.8)
RBC # FLD: 13.8 % — SIGNIFICANT CHANGE UP (ref 10.3–14.5)
SODIUM SERPL-SCNC: 135 MMOL/L — SIGNIFICANT CHANGE UP (ref 135–145)
WBC # BLD: 11.67 K/UL — HIGH (ref 3.8–10.5)
WBC # FLD AUTO: 11.67 K/UL — HIGH (ref 3.8–10.5)

## 2022-07-02 PROCEDURE — 99232 SBSQ HOSP IP/OBS MODERATE 35: CPT

## 2022-07-02 RX ORDER — SODIUM CHLORIDE 9 MG/ML
1000 INJECTION, SOLUTION INTRAVENOUS
Refills: 0 | Status: DISCONTINUED | OUTPATIENT
Start: 2022-07-03 | End: 2022-07-04

## 2022-07-02 RX ADMIN — Medication 975 MILLIGRAM(S): at 14:13

## 2022-07-02 RX ADMIN — ENOXAPARIN SODIUM 40 MILLIGRAM(S): 100 INJECTION SUBCUTANEOUS at 09:44

## 2022-07-02 RX ADMIN — PIPERACILLIN AND TAZOBACTAM 25 GRAM(S): 4; .5 INJECTION, POWDER, LYOPHILIZED, FOR SOLUTION INTRAVENOUS at 13:48

## 2022-07-02 RX ADMIN — Medication 975 MILLIGRAM(S): at 13:41

## 2022-07-02 RX ADMIN — PIPERACILLIN AND TAZOBACTAM 25 GRAM(S): 4; .5 INJECTION, POWDER, LYOPHILIZED, FOR SOLUTION INTRAVENOUS at 05:40

## 2022-07-02 RX ADMIN — Medication 975 MILLIGRAM(S): at 21:52

## 2022-07-02 RX ADMIN — PIPERACILLIN AND TAZOBACTAM 25 GRAM(S): 4; .5 INJECTION, POWDER, LYOPHILIZED, FOR SOLUTION INTRAVENOUS at 21:22

## 2022-07-02 RX ADMIN — Medication 975 MILLIGRAM(S): at 21:22

## 2022-07-02 NOTE — PROGRESS NOTE ADULT - SUBJECTIVE AND OBJECTIVE BOX
Overnight events: None    SUBJECTIVE: Pt seen and examined at bedside.       OBJECTIVE:  Vital Signs Last 24 Hrs  T(C): 37.4 (01 Jul 2022 21:00), Max: 37.7 (01 Jul 2022 05:09)  T(F): 99.4 (01 Jul 2022 21:00), Max: 99.8 (01 Jul 2022 05:09)  HR: 89 (01 Jul 2022 21:00) (82 - 95)  BP: 137/78 (01 Jul 2022 21:00) (112/69 - 142/68)  BP(mean): --  RR: 18 (01 Jul 2022 21:00) (17 - 18)  SpO2: 100% (01 Jul 2022 21:00) (98% - 100%)      06-30-22 @ 07:01  -  07-01-22 @ 07:00  --------------------------------------------------------  IN: 2160 mL / OUT: 900 mL / NET: 1260 mL    07-01-22 @ 07:01  -  07-02-22 @ 02:26  --------------------------------------------------------  IN: 1560 mL / OUT: 1 mL / NET: 1559 mL        Physical Examination:  General Appearance: NAD, patient appears mildly uncomfortable  Neck: Supple  Chest: Equal expansion bilaterally, equal breath sounds  CV: Pulse regular presently  Abdomen: soft, nondistended. RLQ ttp, no rebound or guarding. No palapble mass  Extremities: Grossly symmetric, warm       LABS:                        12.0   10.10 )-----------( 191      ( 01 Jul 2022 06:19 )             35.1       07-01    135  |  101  |  6<L>  ----------------------------<  123<H>  3.6   |  23  |  1.01    Ca    8.5      01 Jul 2022 06:19  Phos  2.5     07-01  Mg     1.70     07-01           Overnight events: None    SUBJECTIVE: Pt seen and examined at bedside.       OBJECTIVE:  Vital Signs Last 24 Hrs  T(C): 37.6 (02 Jul 2022 21:22), Max: 38.2 (02 Jul 2022 14:11)  T(F): 99.7 (02 Jul 2022 21:22), Max: 100.8 (02 Jul 2022 14:11)  HR: 87 (02 Jul 2022 21:22) (78 - 97)  BP: 139/71 (02 Jul 2022 21:22) (125/73 - 139/71)  BP(mean): --  RR: 18 (02 Jul 2022 21:22) (17 - 18)  SpO2: 99% (02 Jul 2022 21:22) (97% - 100%)    I&O's Detail    01 Jul 2022 07:01  -  02 Jul 2022 07:00  --------------------------------------------------------  IN:    dextrose 5% + lactated ringers: 1200 mL    IV PiggyBack: 200 mL    Oral Fluid: 960 mL  Total IN: 2360 mL    OUT:    Stool (mL): 1 mL  Total OUT: 1 mL    Total NET: 2359 mL      02 Jul 2022 07:01  -  02 Jul 2022 23:20  --------------------------------------------------------  IN:    dextrose 5% + lactated ringers: 100 mL    IV PiggyBack: 100 mL    Oral Fluid: 960 mL  Total IN: 1160 mL    OUT:  Total OUT: 0 mL    Total NET: 1160 mL            Physical Examination:  General Appearance: NAD, patient appears mildly uncomfortable  Neck: Supple  Chest: Equal expansion bilaterally, equal breath sounds  CV: Pulse regular presently  Abdomen: soft, nondistended. RLQ ttp, no rebound or guarding. No palapble mass  Extremities: Grossly symmetric, warm       LABS:             07-02    135  |  98  |  7   ----------------------------<  114<H>  3.6   |  24  |  0.96    Ca    8.7      02 Jul 2022 06:44  Phos  4.2     07-02  Mg     2.00     07-02                          12.0   11.67 )-----------( 215      ( 02 Jul 2022 06:44 )             35.2

## 2022-07-02 NOTE — PROGRESS NOTE ADULT - ASSESSMENT
22M 4 days of lower abdominal pain, n/v and febrile in ED to 101.8 found to have acute appendicitis with 1cm appendicolith, significant pericecal inflammation.     Plan:  - low fiber diet  - Zosyn  - ongoing surgical discussion for timing of appendectomy, NPO at midnight  - chem vte ppx  - monitor vital signs, I&O's, AM labs  - replace electrolytes as needed  - Pain control with PRN IV toradol 30mg for moderate pain and dilaudid 0.5mg for severe pain    B Team Surgery  [id number]   22M 4 days of lower abdominal pain, n/v and febrile in ED to 101.8 found to have acute appendicitis with 1cm appendicolith, significant pericecal inflammation. WBC rising and fever to 100.8 reported 7/2    Plan:  - low fiber diet  - Zosyn  - ongoing surgical discussion for timing of appendectomy  - CT AP w IV contrast  - chem vte ppx  - monitor vital signs, I&O's, AM labs  - replace electrolytes as needed  - Pain control with PRN IV toradol 30mg for moderate pain and dilaudid 0.5mg for severe pain    B Team Surgery  [id number]

## 2022-07-02 NOTE — PROGRESS NOTE ADULT - ATTENDING COMMENTS
Acute appendicitis with phlegmon    a.  Note of slight increase in WBC  b.  Remains afebrile  c.  Mildly tenderness, fullness in RLQ  d.  On IV abx  e.  CT reviewed, perform follow up CT  f. Chemical DVT prophylaxis

## 2022-07-03 LAB
ANION GAP SERPL CALC-SCNC: 13 MMOL/L — SIGNIFICANT CHANGE UP (ref 7–14)
BUN SERPL-MCNC: 8 MG/DL — SIGNIFICANT CHANGE UP (ref 7–23)
CALCIUM SERPL-MCNC: 9.1 MG/DL — SIGNIFICANT CHANGE UP (ref 8.4–10.5)
CHLORIDE SERPL-SCNC: 100 MMOL/L — SIGNIFICANT CHANGE UP (ref 98–107)
CO2 SERPL-SCNC: 23 MMOL/L — SIGNIFICANT CHANGE UP (ref 22–31)
CREAT SERPL-MCNC: 0.95 MG/DL — SIGNIFICANT CHANGE UP (ref 0.5–1.3)
EGFR: 118 ML/MIN/1.73M2 — SIGNIFICANT CHANGE UP
GLUCOSE SERPL-MCNC: 102 MG/DL — HIGH (ref 70–99)
HCT VFR BLD CALC: 35.3 % — LOW (ref 39–50)
HGB BLD-MCNC: 12.2 G/DL — LOW (ref 13–17)
MAGNESIUM SERPL-MCNC: 1.9 MG/DL — SIGNIFICANT CHANGE UP (ref 1.6–2.6)
MCHC RBC-ENTMCNC: 27.9 PG — SIGNIFICANT CHANGE UP (ref 27–34)
MCHC RBC-ENTMCNC: 34.6 GM/DL — SIGNIFICANT CHANGE UP (ref 32–36)
MCV RBC AUTO: 80.6 FL — SIGNIFICANT CHANGE UP (ref 80–100)
NRBC # BLD: 0 /100 WBCS — SIGNIFICANT CHANGE UP
NRBC # FLD: 0 K/UL — SIGNIFICANT CHANGE UP
PHOSPHATE SERPL-MCNC: 4.1 MG/DL — SIGNIFICANT CHANGE UP (ref 2.5–4.5)
PLATELET # BLD AUTO: 255 K/UL — SIGNIFICANT CHANGE UP (ref 150–400)
POTASSIUM SERPL-MCNC: 4 MMOL/L — SIGNIFICANT CHANGE UP (ref 3.5–5.3)
POTASSIUM SERPL-SCNC: 4 MMOL/L — SIGNIFICANT CHANGE UP (ref 3.5–5.3)
RBC # BLD: 4.38 M/UL — SIGNIFICANT CHANGE UP (ref 4.2–5.8)
RBC # FLD: 13.7 % — SIGNIFICANT CHANGE UP (ref 10.3–14.5)
SODIUM SERPL-SCNC: 136 MMOL/L — SIGNIFICANT CHANGE UP (ref 135–145)
WBC # BLD: 11.88 K/UL — HIGH (ref 3.8–10.5)
WBC # FLD AUTO: 11.88 K/UL — HIGH (ref 3.8–10.5)

## 2022-07-03 PROCEDURE — 99232 SBSQ HOSP IP/OBS MODERATE 35: CPT

## 2022-07-03 PROCEDURE — 74177 CT ABD & PELVIS W/CONTRAST: CPT | Mod: 26

## 2022-07-03 RX ADMIN — SODIUM CHLORIDE 100 MILLILITER(S): 9 INJECTION, SOLUTION INTRAVENOUS at 21:58

## 2022-07-03 RX ADMIN — PIPERACILLIN AND TAZOBACTAM 25 GRAM(S): 4; .5 INJECTION, POWDER, LYOPHILIZED, FOR SOLUTION INTRAVENOUS at 05:40

## 2022-07-03 RX ADMIN — PIPERACILLIN AND TAZOBACTAM 25 GRAM(S): 4; .5 INJECTION, POWDER, LYOPHILIZED, FOR SOLUTION INTRAVENOUS at 14:26

## 2022-07-03 RX ADMIN — PIPERACILLIN AND TAZOBACTAM 25 GRAM(S): 4; .5 INJECTION, POWDER, LYOPHILIZED, FOR SOLUTION INTRAVENOUS at 21:58

## 2022-07-03 RX ADMIN — Medication 975 MILLIGRAM(S): at 05:40

## 2022-07-03 RX ADMIN — ENOXAPARIN SODIUM 40 MILLIGRAM(S): 100 INJECTION SUBCUTANEOUS at 10:45

## 2022-07-03 NOTE — PROGRESS NOTE ADULT - SUBJECTIVE AND OBJECTIVE BOX
POD #    24hr events:  -     Overnight events:   - No acute events    SUBJECTIVE: patient endorsed having BM and passing gas. no complaints of pain, n/v.      OBJECTIVE:  Vital Signs Last 24 Hrs  T(C): 36.9 (03 Jul 2022 20:50), Max: 38.2 (03 Jul 2022 05:30)  T(F): 98.4 (03 Jul 2022 20:50), Max: 100.7 (03 Jul 2022 05:30)  HR: 72 (03 Jul 2022 20:50) (65 - 85)  BP: 139/75 (03 Jul 2022 20:50) (109/52 - 139/75)  RR: 18 (03 Jul 2022 20:50) (18 - 18)  SpO2: 100% (03 Jul 2022 20:50) (98% - 100%)      07-02-22 @ 07:01  -  07-03-22 @ 07:00  --------------------------------------------------------  IN: 1560 mL / OUT: 0 mL / NET: 1560 mL    07-03-22 @ 07:01  -  07-03-22 @ 21:42  --------------------------------------------------------  IN: 0 mL / OUT: 0 mL / NET: 0 mL        Physical Examination:  GEN: NAD, resting quietly  PULM: symmetric chest rise bilaterally, no increased WOB  ABD: soft, appropriately tender, nondistended, no rebound or guarding, incision CDI  EXTR: no LE erythema, moving all extremities      LABS:                        12.2   11.88 )-----------( 255      ( 03 Jul 2022 06:30 )             35.3       07-03    136  |  100  |  8   ----------------------------<  102<H>  4.0   |  23  |  0.95    Ca    9.1      03 Jul 2022 06:30  Phos  4.1     07-03  Mg     1.90     07-03

## 2022-07-03 NOTE — PROGRESS NOTE ADULT - ATTENDING COMMENTS
Acute appendicitis with phlegmon  a.  Remains afebrile  b.  WBC stable but elevated  c.  Await CT scan  d.  Continue IV abx    At risk for malnutrition  a.  Diet as tolerated    Hypomagnesemia  a.  Replete Mg

## 2022-07-03 NOTE — CONSULT NOTE ADULT - SUBJECTIVE AND OBJECTIVE BOX
Interventional Radiology    HPI: 22M 4 days of lower abdominal pain, n/v and febrile in ED to 101.8 found to have acute appendicitis with 1cm appendicolith, significant pericecal inflammation. WBC rising and fever to 100.8 reported 7/2. New CT from 7/3 demonstrating perforated appendicitis with abscess formation.     Allergies:   Medications (Abx/Cardiac/Anticoagulation/Blood Products)  enoxaparin Injectable: 40 milliGRAM(s) SubCutaneous (07-03 @ 10:45)  piperacillin/tazobactam IVPB..: 25 mL/Hr IV Intermittent (07-03 @ 14:26)    Data:    T(C): 36.7  HR: 69  BP: 134/94  RR: 18  SpO2: 98%    LABS:                          12.2   11.88 )-----------( 255      ( 03 Jul 2022 06:30 )             35.3     07-03    136  |  100  |  8   ----------------------------<  102<H>  4.0   |  23  |  0.95    Ca    9.1      03 Jul 2022 06:30  Phos  4.1     07-03  Mg     1.90     07-03      PT/INR - ( 02 Jul 2022 06:44 )   PT: 14.7 sec;   INR: 1.26 ratio         PTT - ( 02 Jul 2022 06:44 )  PTT:30.5 sec    Radiology: reviewed    Assessment/Plan: 21 yo with acute perforated appendicitis with abscess formation.     -- No percutaneous window available for IR drainage due to interloop abscess  --Reconsult RAFATN    Daniel Ivy MD   Interventional Radiology PGY 4  Available on [employer] TEAMS    For EMERGENT inquiries/questions:  IR Pager ([facility]): [phone]  IR Pager ([facility]): [phone] ; [phone]    For non-emergent consults/questions:   Please place a sunrise order "Consult- Interventional Radiology" with an appropriate callback number    For questions about scheduling during appropriate work hours, call IR :  [facility]: [phone]  [facility]: [phone]    For outpatient IR booking:  [facility]: [phone]  [facility]: [phone]

## 2022-07-03 NOTE — PROGRESS NOTE ADULT - ASSESSMENT
22M 4 days of lower abdominal pain, n/v and febrile in ED to 101.8 found to have acute appendicitis with 1cm appendicolith, significant pericecal inflammation. CT abd and pelvis showed 4x4cm contained collection and appendiceal perforation    Plan:  - low fiber diet, NPO after midnight for IR drainiage  - Zosyn  - hold vte ppx for IR  - monitor vital signs, I&O's, AM labs  - replace electrolytes as needed  - Pain control with PRN IV toradol 30mg for moderate pain and dilaudid 0.5mg for severe pain

## 2022-07-04 ENCOUNTER — TRANSCRIPTION ENCOUNTER (OUTPATIENT)
Age: 22
End: 2022-07-04

## 2022-07-04 LAB
ANION GAP SERPL CALC-SCNC: 12 MMOL/L — SIGNIFICANT CHANGE UP (ref 7–14)
BUN SERPL-MCNC: 11 MG/DL — SIGNIFICANT CHANGE UP (ref 7–23)
CALCIUM SERPL-MCNC: 9.2 MG/DL — SIGNIFICANT CHANGE UP (ref 8.4–10.5)
CHLORIDE SERPL-SCNC: 98 MMOL/L — SIGNIFICANT CHANGE UP (ref 98–107)
CO2 SERPL-SCNC: 25 MMOL/L — SIGNIFICANT CHANGE UP (ref 22–31)
CREAT SERPL-MCNC: 0.96 MG/DL — SIGNIFICANT CHANGE UP (ref 0.5–1.3)
CULTURE RESULTS: SIGNIFICANT CHANGE UP
CULTURE RESULTS: SIGNIFICANT CHANGE UP
EGFR: 116 ML/MIN/1.73M2 — SIGNIFICANT CHANGE UP
GLUCOSE SERPL-MCNC: 107 MG/DL — HIGH (ref 70–99)
HCT VFR BLD CALC: 35.7 % — LOW (ref 39–50)
HGB BLD-MCNC: 12.1 G/DL — LOW (ref 13–17)
MAGNESIUM SERPL-MCNC: 2 MG/DL — SIGNIFICANT CHANGE UP (ref 1.6–2.6)
MCHC RBC-ENTMCNC: 27.1 PG — SIGNIFICANT CHANGE UP (ref 27–34)
MCHC RBC-ENTMCNC: 33.9 GM/DL — SIGNIFICANT CHANGE UP (ref 32–36)
MCV RBC AUTO: 79.9 FL — LOW (ref 80–100)
NRBC # BLD: 0 /100 WBCS — SIGNIFICANT CHANGE UP
NRBC # FLD: 0 K/UL — SIGNIFICANT CHANGE UP
PHOSPHATE SERPL-MCNC: 5 MG/DL — HIGH (ref 2.5–4.5)
PLATELET # BLD AUTO: 294 K/UL — SIGNIFICANT CHANGE UP (ref 150–400)
POTASSIUM SERPL-MCNC: 4.1 MMOL/L — SIGNIFICANT CHANGE UP (ref 3.5–5.3)
POTASSIUM SERPL-SCNC: 4.1 MMOL/L — SIGNIFICANT CHANGE UP (ref 3.5–5.3)
RBC # BLD: 4.47 M/UL — SIGNIFICANT CHANGE UP (ref 4.2–5.8)
RBC # FLD: 14 % — SIGNIFICANT CHANGE UP (ref 10.3–14.5)
SODIUM SERPL-SCNC: 135 MMOL/L — SIGNIFICANT CHANGE UP (ref 135–145)
SPECIMEN SOURCE: SIGNIFICANT CHANGE UP
SPECIMEN SOURCE: SIGNIFICANT CHANGE UP
WBC # BLD: 9.05 K/UL — SIGNIFICANT CHANGE UP (ref 3.8–10.5)
WBC # FLD AUTO: 9.05 K/UL — SIGNIFICANT CHANGE UP (ref 3.8–10.5)

## 2022-07-04 RX ORDER — IBUPROFEN 200 MG
600 TABLET ORAL EVERY 6 HOURS
Refills: 0 | Status: DISCONTINUED | OUTPATIENT
Start: 2022-07-04 | End: 2022-07-05

## 2022-07-04 RX ORDER — OXYCODONE HYDROCHLORIDE 5 MG/1
5 TABLET ORAL EVERY 4 HOURS
Refills: 0 | Status: DISCONTINUED | OUTPATIENT
Start: 2022-07-04 | End: 2022-07-05

## 2022-07-04 RX ORDER — HEPARIN SODIUM 5000 [USP'U]/ML
5000 INJECTION INTRAVENOUS; SUBCUTANEOUS EVERY 8 HOURS
Refills: 0 | Status: DISCONTINUED | OUTPATIENT
Start: 2022-07-04 | End: 2022-07-05

## 2022-07-04 RX ADMIN — HEPARIN SODIUM 5000 UNIT(S): 5000 INJECTION INTRAVENOUS; SUBCUTANEOUS at 23:09

## 2022-07-04 RX ADMIN — SODIUM CHLORIDE 100 MILLILITER(S): 9 INJECTION, SOLUTION INTRAVENOUS at 05:35

## 2022-07-04 RX ADMIN — PIPERACILLIN AND TAZOBACTAM 25 GRAM(S): 4; .5 INJECTION, POWDER, LYOPHILIZED, FOR SOLUTION INTRAVENOUS at 05:35

## 2022-07-04 RX ADMIN — Medication 1 TABLET(S): at 13:16

## 2022-07-04 RX ADMIN — HEPARIN SODIUM 5000 UNIT(S): 5000 INJECTION INTRAVENOUS; SUBCUTANEOUS at 13:16

## 2022-07-04 NOTE — PROGRESS NOTE ADULT - ASSESSMENT
22M 4 days of lower abdominal pain, n/v and febrile in ED to 101.8 found to have acute appendicitis with 1cm appendicolith, significant pericecal inflammation. CT abd and pelvis showed 4x4cm contained collection and appendiceal perforation    Plan:  - low fiber diet, NPO after midnight for IR drainiage  - Zosyn  - hold vte ppx for IR  - monitor vital signs, I&O's, AM labs  - replace electrolytes as needed  - Pain control with PRN IV toradol 30mg for moderate pain and dilaudid 0.5mg for severe pain    B Team Surgery  [id number]   22M 4 days of lower abdominal pain, n/v and febrile in ED to 101.8 found to have acute appendicitis with 1cm appendicolith, significant pericecal inflammation. CT abd and pelvis showed 4x4cm contained collection and appendiceal perforation. IR unable to drain for for lack of percutaneous access. currently stable.    Plan:  - low fiber diet  - transition to Augmentin and monitoring temp &WBC  - restarted DVT ppx   - monitor vital signs, I&O's, AM labs  - replace electrolytes as needed  - Pain control with PRN IV toradol 30mg for moderate pain and dilaudid 0.5mg for severe pain    MD NATIVIDAD Lozano Team Surgery  [id number]

## 2022-07-04 NOTE — PROGRESS NOTE ADULT - SUBJECTIVE AND OBJECTIVE BOX
Overnight events: None    SUBJECTIVE: Pt seen and examined at bedside.       OBJECTIVE:  Vital Signs Last 24 Hrs  T(C): 36.9 (03 Jul 2022 20:50), Max: 38.2 (03 Jul 2022 05:30)  T(F): 98.4 (03 Jul 2022 20:50), Max: 100.7 (03 Jul 2022 05:30)  HR: 72 (03 Jul 2022 20:50) (65 - 85)  BP: 139/75 (03 Jul 2022 20:50) (109/52 - 139/75)  BP(mean): --  RR: 18 (03 Jul 2022 20:50) (18 - 18)  SpO2: 100% (03 Jul 2022 20:50) (98% - 100%)      07-02-22 @ 07:01  -  07-03-22 @ 07:00  --------------------------------------------------------  IN: 1560 mL / OUT: 0 mL / NET: 1560 mL    07-03-22 @ 07:01  -  07-04-22 @ 03:30  --------------------------------------------------------  IN: 840 mL / OUT: 0 mL / NET: 840 mL        Physical Examination:  GEN: NAD, resting quietly  PULM: symmetric chest rise bilaterally, no increased WOB  ABD: soft, appropriately tender, nondistended, no rebound or guarding, incision CDI  EXTR: no LE erythema, moving all extremities    LABS:                        12.2   11.88 )-----------( 255      ( 03 Jul 2022 06:30 )             35.3       07-03    136  |  100  |  8   ----------------------------<  102<H>  4.0   |  23  |  0.95    Ca    9.1      03 Jul 2022 06:30  Phos  4.1     07-03  Mg     1.90     07-03         24hr events:  - CT abd and pelvis showed appendiceal perf and [id number] contained collection in midline abdomen    Overnight events:   - No acute events    SUBJECTIVE: patient seen and examined this morning. reports no pain, diarrhea, n/v.      OBJECTIVE:  Vital Signs Last 24 Hrs  T(C): 36.9 (04 Jul 2022 13:29), Max: 37.7 (04 Jul 2022 06:00)  T(F): 98.4 (04 Jul 2022 13:29), Max: 99.9 (04 Jul 2022 06:00)  HR: 76 (04 Jul 2022 13:29) (65 - 76)  BP: 125/67 (04 Jul 2022 13:29) (110/67 - 139/75)  BP(mean): --  RR: 15 (04 Jul 2022 13:29) (15 - 18)  SpO2: 100% (04 Jul 2022 13:29) (97% - 100%)      07-03-22 @ 07:01  -  07-04-22 @ 07:00  --------------------------------------------------------  IN: 1240 mL / OUT: 0 mL / NET: 1240 mL        Physical Examination:  GEN: NAD, resting quietly  PULM: symmetric chest rise bilaterally, no increased WOB  ABD: soft, appropriately tender, nondistended, no rebound or guarding.  EXTR: no LE erythema, moving all extremities      LABS:                        12.1   9.05  )-----------( 294      ( 04 Jul 2022 06:56 )             35.7       07-04    135  |  98  |  11  ----------------------------<  107<H>  4.1   |  25  |  0.96    Ca    9.2      04 Jul 2022 06:56  Phos  5.0     07-04  Mg     2.00     07-04

## 2022-07-05 ENCOUNTER — TRANSCRIPTION ENCOUNTER (OUTPATIENT)
Age: 22
End: 2022-07-05

## 2022-07-05 VITALS
DIASTOLIC BLOOD PRESSURE: 65 MMHG | HEART RATE: 71 BPM | SYSTOLIC BLOOD PRESSURE: 126 MMHG | RESPIRATION RATE: 18 BRPM | OXYGEN SATURATION: 100 % | TEMPERATURE: 98 F

## 2022-07-05 LAB
ANION GAP SERPL CALC-SCNC: 13 MMOL/L — SIGNIFICANT CHANGE UP (ref 7–14)
BUN SERPL-MCNC: 15 MG/DL — SIGNIFICANT CHANGE UP (ref 7–23)
CALCIUM SERPL-MCNC: 9.5 MG/DL — SIGNIFICANT CHANGE UP (ref 8.4–10.5)
CHLORIDE SERPL-SCNC: 100 MMOL/L — SIGNIFICANT CHANGE UP (ref 98–107)
CO2 SERPL-SCNC: 23 MMOL/L — SIGNIFICANT CHANGE UP (ref 22–31)
CREAT SERPL-MCNC: 0.96 MG/DL — SIGNIFICANT CHANGE UP (ref 0.5–1.3)
EGFR: 116 ML/MIN/1.73M2 — SIGNIFICANT CHANGE UP
GLUCOSE SERPL-MCNC: 102 MG/DL — HIGH (ref 70–99)
HCT VFR BLD CALC: 39 % — SIGNIFICANT CHANGE UP (ref 39–50)
HGB BLD-MCNC: 12.7 G/DL — LOW (ref 13–17)
MAGNESIUM SERPL-MCNC: 1.9 MG/DL — SIGNIFICANT CHANGE UP (ref 1.6–2.6)
MCHC RBC-ENTMCNC: 26.8 PG — LOW (ref 27–34)
MCHC RBC-ENTMCNC: 32.6 GM/DL — SIGNIFICANT CHANGE UP (ref 32–36)
MCV RBC AUTO: 82.3 FL — SIGNIFICANT CHANGE UP (ref 80–100)
NRBC # BLD: 0 /100 WBCS — SIGNIFICANT CHANGE UP
NRBC # FLD: 0 K/UL — SIGNIFICANT CHANGE UP
PHOSPHATE SERPL-MCNC: 4.3 MG/DL — SIGNIFICANT CHANGE UP (ref 2.5–4.5)
PLATELET # BLD AUTO: 348 K/UL — SIGNIFICANT CHANGE UP (ref 150–400)
POTASSIUM SERPL-MCNC: 4 MMOL/L — SIGNIFICANT CHANGE UP (ref 3.5–5.3)
POTASSIUM SERPL-SCNC: 4 MMOL/L — SIGNIFICANT CHANGE UP (ref 3.5–5.3)
RBC # BLD: 4.74 M/UL — SIGNIFICANT CHANGE UP (ref 4.2–5.8)
RBC # FLD: 13.8 % — SIGNIFICANT CHANGE UP (ref 10.3–14.5)
SARS-COV-2 RNA SPEC QL NAA+PROBE: SIGNIFICANT CHANGE UP
SODIUM SERPL-SCNC: 136 MMOL/L — SIGNIFICANT CHANGE UP (ref 135–145)
WBC # BLD: 12.1 K/UL — HIGH (ref 3.8–10.5)
WBC # FLD AUTO: 12.1 K/UL — HIGH (ref 3.8–10.5)

## 2022-07-05 RX ORDER — ACETAMINOPHEN 500 MG
3 TABLET ORAL
Qty: 0 | Refills: 0 | DISCHARGE
Start: 2022-07-05

## 2022-07-05 RX ORDER — IBUPROFEN 200 MG
1 TABLET ORAL
Qty: 0 | Refills: 0 | DISCHARGE
Start: 2022-07-05

## 2022-07-05 RX ADMIN — Medication 1 TABLET(S): at 02:49

## 2022-07-05 RX ADMIN — Medication 1 TABLET(S): at 14:02

## 2022-07-05 RX ADMIN — HEPARIN SODIUM 5000 UNIT(S): 5000 INJECTION INTRAVENOUS; SUBCUTANEOUS at 05:51

## 2022-07-05 RX ADMIN — HEPARIN SODIUM 5000 UNIT(S): 5000 INJECTION INTRAVENOUS; SUBCUTANEOUS at 14:01

## 2022-07-05 NOTE — PROGRESS NOTE ADULT - REASON FOR ADMISSION
acute appendicitis

## 2022-07-05 NOTE — PROGRESS NOTE ADULT - ASSESSMENT
22M 4 days of lower abdominal pain, n/v and febrile in ED to 101.8 found to have acute appendicitis with 1cm appendicolith, significant pericecal inflammation. CT abd and pelvis showed 4x4cm contained collection and appendiceal perforation. IR unable to drain for for lack of percutaneous access. currently stable.    Plan:  - low fiber diet  - transition to Augmentin and monitoring temp &WBC  - restarted DVT ppx   - monitor vital signs, I&O's, AM labs  - replace electrolytes as needed  - Pain control with PRN IV toradol 30mg for moderate pain and dilaudid 0.5mg for severe pain      B Team Surgery  [id number]   22M 4 days of lower abdominal pain, n/v and febrile in ED to 101.8 found to have acute appendicitis with 1cm appendicolith, significant pericecal inflammation. CT abd and pelvis showed 4x4cm contained collection and appendiceal perforation. IR unable to drain for for lack of percutaneous access. currently stable.    Plan:  - low fiber diet  - Augmentin BID x2 week total course   - restarted DVT ppx   - monitor vital signs, I&O's, AM labs  - replace electrolytes as needed  - Pain control with PRN   -DISPO home today       B Team Surgery  [id number]

## 2022-07-05 NOTE — PROGRESS NOTE ADULT - SUBJECTIVE AND OBJECTIVE BOX
Overnight events: None    SUBJECTIVE: Pt seen and examined at bedside.       OBJECTIVE:  Vital Signs Last 24 Hrs  T(C): 36.4 (04 Jul 2022 20:56), Max: 37.7 (04 Jul 2022 06:00)  T(F): 97.5 (04 Jul 2022 20:56), Max: 99.9 (04 Jul 2022 06:00)  HR: 72 (04 Jul 2022 20:56) (68 - 77)  BP: 132/72 (04 Jul 2022 20:56) (110/67 - 132/72)  BP(mean): --  RR: 18 (04 Jul 2022 20:56) (15 - 18)  SpO2: 100% (04 Jul 2022 20:56) (97% - 100%)      07-03-22 @ 07:01  -  07-04-22 @ 07:00  --------------------------------------------------------  IN: 1240 mL / OUT: 0 mL / NET: 1240 mL    07-04-22 @ 07:01  -  07-05-22 @ 00:37  --------------------------------------------------------  IN: 740 mL / OUT: 0 mL / NET: 740 mL        Physical Examination:  GEN: NAD, resting quietly  PULM: symmetric chest rise bilaterally, no increased WOB  ABD: soft, appropriately tender, nondistended, no rebound or guarding.  EXTR: no LE erythema, moving all extremities        LABS:                        12.1   9.05  )-----------( 294      ( 04 Jul 2022 06:56 )             35.7       07-04    135  |  98  |  11  ----------------------------<  107<H>  4.1   |  25  |  0.96    Ca    9.2      04 Jul 2022 06:56  Phos  5.0     07-04  Mg     2.00     07-04

## 2022-07-05 NOTE — DISCHARGE NOTE NURSING/CASE MANAGEMENT/SOCIAL WORK - BRAND OF COVID-19 VACCINATION
Patient does not have vax card with him, does not know exact or approximate date of booster shots. Patient unable to remember at this time./Pfizer dose 1, 2, and 3

## 2022-07-05 NOTE — DISCHARGE NOTE NURSING/CASE MANAGEMENT/SOCIAL WORK - NSDCPEFALRISK_GEN_ALL_CORE
For information on Fall & Injury Prevention, visit: [email or web address] OR  [email or web address] OR  https://www.cdc.gov/steadi/patient.html

## 2022-07-05 NOTE — DISCHARGE NOTE NURSING/CASE MANAGEMENT/SOCIAL WORK - PATIENT PORTAL LINK FT
You can access the FollowMyHealth Patient Portal offered by [facility] by registering at the following website: [email or web address]. By joining [facility]’s FollowMyHealth portal, you will also be able to view your health information using other applications (apps) compatible with our system.

## 2022-07-29 ENCOUNTER — APPOINTMENT (OUTPATIENT)
Dept: TRAUMA SURGERY | Facility: HOSPITAL | Age: 22
End: 2022-07-29

## 2022-07-29 VITALS
DIASTOLIC BLOOD PRESSURE: 50 MMHG | HEIGHT: 72 IN | HEART RATE: 62 BPM | TEMPERATURE: 98.3 F | SYSTOLIC BLOOD PRESSURE: 119 MMHG | BODY MASS INDEX: 32.64 KG/M2 | WEIGHT: 241 LBS

## 2022-07-29 PROCEDURE — 99213 OFFICE O/P EST LOW 20 MIN: CPT | Mod: 57

## 2022-07-29 NOTE — PHYSICAL EXAM
[Normal Breath Sounds] : Normal breath sounds [Normal Heart Sounds] : normal heart sounds [Abdominal Masses] : No abdominal masses [Abdomen Tenderness] : ~T ~M No abdominal tenderness [No HSM] : no hepatosplenomegaly [No Rash or Lesion] : No rash or lesion [Alert] : alert [Oriented to Person] : oriented to person [Oriented to Place] : oriented to place [Oriented to Time] : oriented to time [Calm] : calm [de-identified] : well built healthy appearing young man [de-identified] : supple [de-identified] : anicteric, moist membranes [de-identified] : soft, NT/ND, no masses/hernias [de-identified] : no calf tenderness, no ankle edema

## 2022-07-29 NOTE — HISTORY OF PRESENT ILLNESS
[de-identified] : 22M healthy here for operative planning for definitive management of perforated appendicitis with intra-abdominal abscess end-June 2022.  Hospitalized, treated with intravenous antibiotics, repeat CT scan showed improved mid-abd collection, discharged home with instructions to complete a course of oral antibiotics.  Since hospital discharge, completed antibiotics, feeling better.  No fever, nausea, vomiting, diarrhea, constipation, dysuria.  Only mild discomfort suprapubic region when laying prone.\par \par PMH/PSH: denies\par SH: lives with parents, student, likes to work out\par FH: non-contributory\par NKDA\par Meds: nil

## 2022-07-29 NOTE — ASSESSMENT
[FreeTextEntry1] : h/o perforated appendicitis with intra-abd abscess\par clinically better\par ready to schedule interval lap possible open appendectomy\par patient understands the risks, benefits, alternatives (no surgery) and recovery including possibility of open operation\par \par he agrees\par will schedule PST and surgery mid-late August

## 2022-08-16 DIAGNOSIS — Z01.818 ENCOUNTER FOR OTHER PREPROCEDURAL EXAMINATION: ICD-10-CM

## 2022-08-17 ENCOUNTER — OUTPATIENT (OUTPATIENT)
Dept: OUTPATIENT SERVICES | Facility: HOSPITAL | Age: 22
LOS: 1 days | End: 2022-08-17

## 2022-08-17 VITALS
HEIGHT: 72 IN | RESPIRATION RATE: 16 BRPM | HEART RATE: 68 BPM | OXYGEN SATURATION: 99 % | WEIGHT: 242.07 LBS | TEMPERATURE: 99 F | DIASTOLIC BLOOD PRESSURE: 80 MMHG | SYSTOLIC BLOOD PRESSURE: 128 MMHG

## 2022-08-17 DIAGNOSIS — K35.33 ACUTE APPENDICITIS WITH PERFORATION, LOCALIZED PERITONITIS, AND GANGRENE, WITH ABSCESS: ICD-10-CM

## 2022-08-17 LAB
HCT VFR BLD CALC: 42.8 % — SIGNIFICANT CHANGE UP (ref 39–50)
HGB BLD-MCNC: 13.7 G/DL — SIGNIFICANT CHANGE UP (ref 13–17)
MCHC RBC-ENTMCNC: 27.3 PG — SIGNIFICANT CHANGE UP (ref 27–34)
MCHC RBC-ENTMCNC: 32 GM/DL — SIGNIFICANT CHANGE UP (ref 32–36)
MCV RBC AUTO: 85.3 FL — SIGNIFICANT CHANGE UP (ref 80–100)
NRBC # BLD: 0 /100 WBCS — SIGNIFICANT CHANGE UP (ref 0–0)
NRBC # FLD: 0 K/UL — SIGNIFICANT CHANGE UP (ref 0–0)
PLATELET # BLD AUTO: 294 K/UL — SIGNIFICANT CHANGE UP (ref 150–400)
RBC # BLD: 5.02 M/UL — SIGNIFICANT CHANGE UP (ref 4.2–5.8)
RBC # FLD: 14.4 % — SIGNIFICANT CHANGE UP (ref 10.3–14.5)
WBC # BLD: 7.46 K/UL — SIGNIFICANT CHANGE UP (ref 3.8–10.5)
WBC # FLD AUTO: 7.46 K/UL — SIGNIFICANT CHANGE UP (ref 3.8–10.5)

## 2022-08-17 RX ORDER — SODIUM CHLORIDE 9 MG/ML
1000 INJECTION, SOLUTION INTRAVENOUS
Refills: 0 | Status: DISCONTINUED | OUTPATIENT
Start: 2022-08-22 | End: 2022-09-05

## 2022-08-17 NOTE — H&P PST ADULT - DATE OF LAST VACCINATION
filed completed POLST form, dated 10/1/18, into patient's electronic Resuscitation Wishes/POLST medical record.  to remain available at pager [phone] for further care/support. Statement Selected 02-Apr-2021

## 2022-08-17 NOTE — H&P PST ADULT - RESPIRATORY
clear to auscultation bilaterally/no wheezes/no rales/no rhonchi/no respiratory distress/no subcutaneous emphysema/breath sounds equal/good air movement/respirations non-labored

## 2022-08-17 NOTE — H&P PST ADULT - NSANTHOSAYNRD_GEN_A_CORE
No. NHUNG screening performed.  STOP BANG Legend: 0-2 = LOW Risk; 3-4 = INTERMEDIATE Risk; 5-8 = HIGH Risk

## 2022-08-17 NOTE — H&P PST ADULT - PROBLEM SELECTOR PLAN 1
Schedule for interval laparoscopic appendectomy possible open tentatively on 08/22/2022. Pre op instructions, famotidine, chlorhexidine gluconate soap given and explained. Pt verbalized understanding.  Covi-19 PCR test ordered

## 2022-08-17 NOTE — H&P PST ADULT - HISTORY OF PRESENT ILLNESS
22 yr old male with preop dx of acute appendicitis with perforation and loc peritonitis with abscess presents to have PST eval for Interval laparoscopic appendectomy possible open.  Patient states in June 28 2022 had c/o abdominal discomfort vomiting and was hospitalized for 6 days and had ultrasound was treated with antibiotics. 22 yr old male with preop dx of acute appendicitis with perforation and loc peritonitis with abscess presents to have PST eval for Interval laparoscopic appendectomy possible open.  Patient states in June 28 2022 had c/o abdominal discomfort vomiting - s/p US of abdomen 06/29/22 which showed that the appendix was perforated and was hospitalized at [facility] for 6 days.

## 2022-08-17 NOTE — H&P PST ADULT - NEGATIVE OPHTHALMOLOGIC SYMPTOMS
no diplopia/no photophobia/no pain L/no pain R no diplopia/no photophobia/no blurred vision L/no blurred vision R/no pain L/no pain R/no loss of vision L/no loss of vision R

## 2022-08-17 NOTE — H&P PST ADULT - NEGATIVE GASTROINTESTINAL SYMPTOMS
no vomiting/no diarrhea/no constipation/no change in bowel habits no nausea/no vomiting/no diarrhea/no constipation/no change in bowel habits/no melena/no jaundice

## 2022-08-17 NOTE — H&P PST ADULT - NEGATIVE GENERAL SYMPTOMS
no fever/no chills/no weight loss/no weight gain no fever/no chills/no weight loss/no weight gain/no malaise

## 2022-08-21 ENCOUNTER — TRANSCRIPTION ENCOUNTER (OUTPATIENT)
Age: 22
End: 2022-08-21

## 2022-08-22 ENCOUNTER — APPOINTMENT (OUTPATIENT)
Dept: TRAUMA SURGERY | Facility: HOSPITAL | Age: 22
End: 2022-08-22

## 2022-08-22 ENCOUNTER — OUTPATIENT (OUTPATIENT)
Dept: OUTPATIENT SERVICES | Facility: HOSPITAL | Age: 22
LOS: 1 days | Discharge: HOME | End: 2022-08-22

## 2022-08-22 ENCOUNTER — TRANSCRIPTION ENCOUNTER (OUTPATIENT)
Age: 22
End: 2022-08-22

## 2022-08-22 ENCOUNTER — RESULT REVIEW (OUTPATIENT)
Age: 22
End: 2022-08-22

## 2022-08-22 VITALS
HEART RATE: 64 BPM | DIASTOLIC BLOOD PRESSURE: 69 MMHG | WEIGHT: 242.07 LBS | HEIGHT: 72 IN | SYSTOLIC BLOOD PRESSURE: 134 MMHG | TEMPERATURE: 98 F | RESPIRATION RATE: 20 BRPM | OXYGEN SATURATION: 99 %

## 2022-08-22 VITALS
RESPIRATION RATE: 20 BRPM | DIASTOLIC BLOOD PRESSURE: 72 MMHG | HEART RATE: 78 BPM | SYSTOLIC BLOOD PRESSURE: 141 MMHG | OXYGEN SATURATION: 98 %

## 2022-08-22 DIAGNOSIS — K35.33 ACUTE APPENDICITIS WITH PERFORATION, LOCALIZED PERITONITIS, AND GANGRENE, WITH ABSCESS: ICD-10-CM

## 2022-08-22 PROCEDURE — 44970 LAPAROSCOPY APPENDECTOMY: CPT | Mod: GC

## 2022-08-22 PROCEDURE — 88304 TISSUE EXAM BY PATHOLOGIST: CPT | Mod: 26

## 2022-08-22 DEVICE — STAPLER COVIDIEN TRI-STAPLE 30MM PURPLE RELOAD: Type: IMPLANTABLE DEVICE | Status: FUNCTIONAL

## 2022-08-22 RX ORDER — OXYCODONE HYDROCHLORIDE 5 MG/1
5 TABLET ORAL ONCE
Refills: 0 | Status: DISCONTINUED | OUTPATIENT
Start: 2022-08-22 | End: 2022-08-22

## 2022-08-22 RX ORDER — ONDANSETRON 8 MG/1
4 TABLET, FILM COATED ORAL ONCE
Refills: 0 | Status: COMPLETED | OUTPATIENT
Start: 2022-08-22 | End: 2022-08-22

## 2022-08-22 RX ORDER — SODIUM CHLORIDE 9 MG/ML
1000 INJECTION, SOLUTION INTRAVENOUS
Refills: 0 | Status: DISCONTINUED | OUTPATIENT
Start: 2022-08-22 | End: 2022-09-05

## 2022-08-22 RX ORDER — HYDROMORPHONE HYDROCHLORIDE 2 MG/ML
0.5 INJECTION INTRAMUSCULAR; INTRAVENOUS; SUBCUTANEOUS
Refills: 0 | Status: DISCONTINUED | OUTPATIENT
Start: 2022-08-22 | End: 2022-08-22

## 2022-08-22 RX ORDER — ACETAMINOPHEN 500 MG
1000 TABLET ORAL ONCE
Refills: 0 | Status: COMPLETED | OUTPATIENT
Start: 2022-08-22 | End: 2022-08-22

## 2022-08-22 RX ORDER — OXYCODONE HYDROCHLORIDE 5 MG/1
1 TABLET ORAL
Qty: 8 | Refills: 0
Start: 2022-08-22

## 2022-08-22 RX ORDER — MULTIVIT-MIN/FERROUS GLUCONATE 9 MG/15 ML
1 LIQUID (ML) ORAL
Qty: 0 | Refills: 0 | DISCHARGE

## 2022-08-22 RX ORDER — HYDROMORPHONE HYDROCHLORIDE 2 MG/ML
1 INJECTION INTRAMUSCULAR; INTRAVENOUS; SUBCUTANEOUS
Refills: 0 | Status: DISCONTINUED | OUTPATIENT
Start: 2022-08-22 | End: 2022-08-22

## 2022-08-22 RX ADMIN — OXYCODONE HYDROCHLORIDE 5 MILLIGRAM(S): 5 TABLET ORAL at 17:05

## 2022-08-22 RX ADMIN — OXYCODONE HYDROCHLORIDE 5 MILLIGRAM(S): 5 TABLET ORAL at 17:26

## 2022-08-22 RX ADMIN — Medication 1000 MILLIGRAM(S): at 17:26

## 2022-08-22 RX ADMIN — ONDANSETRON 4 MILLIGRAM(S): 8 TABLET, FILM COATED ORAL at 17:05

## 2022-08-22 RX ADMIN — Medication 400 MILLIGRAM(S): at 17:05

## 2022-08-22 NOTE — ASU DISCHARGE PLAN (ADULT/PEDIATRIC) - ASU DC SPECIAL INSTRUCTIONSFT
You may take Tylenol and Motrin over the counter for mild to moderate pain.  The best way to take these medications is by alternating between Tylenol and Motrin every 3 hours   Oxycodone has been sent to your pharmacy for severe pain, take as prescribed  In 48 hours you may take off drain dressing and shower normally with drain  If there is leakage around the drain you may cover with a dressing.   Please make an appointment to see Dr. Bernabe in the office in 2 weeks

## 2022-08-22 NOTE — ASU DISCHARGE PLAN (ADULT/PEDIATRIC) - CARE PROVIDER_API CALL
Flakita Bernabe)  Surgery; Surgical Critical Care  [phone] [place] [address]  Phone: [phone]  Fax: [phone]  Follow Up Time: 2 weeks

## 2022-08-22 NOTE — ASU DISCHARGE PLAN (ADULT/PEDIATRIC) - NS MD DC FALL RISK RISK
For information on Fall & Injury Prevention, visit: [email or web address] OR  [email or web address] OR  https://www.cdc.gov/steadi/patient.html

## 2022-08-22 NOTE — BRIEF OPERATIVE NOTE - OPERATION/FINDINGS
Appendix adhered to the right abdominal sidewall and sigmoid colon. Previous abscess cavity entered and fecalith suctioned from abdomen. Base of appendix dissected free from inflammatory attachments and stapled at it confluence with the cecum using an Endo PATRICIA stapler. The remainder of the appendix was attempted to be dissected free from its inflammatory attachments to the sigmoid colon. As much of the appendix that could be safely dissected free was taken. Abdomen was suctioned of fluid. A PADMAJA drain was left adjacent to previous abscess cavity.

## 2022-08-30 LAB — SURGICAL PATHOLOGY STUDY: SIGNIFICANT CHANGE UP

## 2022-09-06 ENCOUNTER — APPOINTMENT (OUTPATIENT)
Dept: SURGERY | Facility: HOSPITAL | Age: 22
End: 2022-09-06

## 2022-09-06 VITALS
HEART RATE: 73 BPM | TEMPERATURE: 98.2 F | WEIGHT: 245 LBS | HEIGHT: 72 IN | DIASTOLIC BLOOD PRESSURE: 64 MMHG | SYSTOLIC BLOOD PRESSURE: 143 MMHG | BODY MASS INDEX: 33.18 KG/M2

## 2022-09-06 PROBLEM — K35.80 UNSPECIFIED ACUTE APPENDICITIS: Chronic | Status: ACTIVE | Noted: 2022-08-17

## 2022-09-06 NOTE — HISTORY OF PRESENT ILLNESS
[de-identified] : Patient s/p lap appy for perf appendicitis and interval appendectomy. Patient doing well, tolerating diet, pain is controlled. PADMAJA with 50 cc of serous fluid daily. Incisions are well healed. Pathology consistent with acute appendictis

## 2022-12-24 NOTE — ASU PATIENT PROFILE, ADULT - HEALTHCARE INFORMATION NEEDED, PROFILE
Pt baseline mental status, axox0, Patient confused at baseline. Pt positive pulses in all 4 extremities, positive swelling to lower extremities, lungs clear bilat, skin warm dry. 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 Patient has additional stage 2 on left hip and stage 1 on right hip bony prominence. none

## 2023-03-09 ENCOUNTER — APPOINTMENT (OUTPATIENT)
Dept: ORTHOPEDIC SURGERY | Facility: CLINIC | Age: 23
End: 2023-03-09
Payer: COMMERCIAL

## 2023-03-09 VITALS
HEART RATE: 84 BPM | WEIGHT: 260 LBS | TEMPERATURE: 97.3 F | HEIGHT: 72 IN | OXYGEN SATURATION: 98 % | DIASTOLIC BLOOD PRESSURE: 56 MMHG | BODY MASS INDEX: 35.21 KG/M2 | SYSTOLIC BLOOD PRESSURE: 132 MMHG

## 2023-03-09 DIAGNOSIS — S93.491A SPRAIN OF OTHER LIGAMENT OF RIGHT ANKLE, INITIAL ENCOUNTER: ICD-10-CM

## 2023-03-09 DIAGNOSIS — M25.562 PAIN IN LEFT KNEE: ICD-10-CM

## 2023-03-09 PROCEDURE — 99203 OFFICE O/P NEW LOW 30 MIN: CPT

## 2023-03-09 PROCEDURE — 73562 X-RAY EXAM OF KNEE 3: CPT | Mod: LT

## 2023-03-09 PROCEDURE — 73610 X-RAY EXAM OF ANKLE: CPT | Mod: RT

## 2024-03-06 ENCOUNTER — APPOINTMENT (OUTPATIENT)
Dept: ORTHOPEDIC SURGERY | Facility: CLINIC | Age: 24
End: 2024-03-06
Payer: MEDICAID

## 2024-03-06 VITALS — HEIGHT: 72 IN | WEIGHT: 260 LBS | BODY MASS INDEX: 35.21 KG/M2

## 2024-03-06 DIAGNOSIS — M22.2X2 PATELLOFEMORAL DISORDERS, LEFT KNEE: ICD-10-CM

## 2024-03-06 PROCEDURE — 99213 OFFICE O/P EST LOW 20 MIN: CPT

## 2024-03-06 PROCEDURE — 73562 X-RAY EXAM OF KNEE 3: CPT | Mod: LT

## 2024-03-06 PROCEDURE — 73610 X-RAY EXAM OF ANKLE: CPT | Mod: RT

## 2024-03-09 ENCOUNTER — APPOINTMENT (OUTPATIENT)
Dept: MRI IMAGING | Facility: CLINIC | Age: 24
End: 2024-03-09
Payer: MEDICAID

## 2024-03-09 ENCOUNTER — OUTPATIENT (OUTPATIENT)
Dept: OUTPATIENT SERVICES | Facility: HOSPITAL | Age: 24
LOS: 1 days | End: 2024-03-09
Payer: MEDICAID

## 2024-03-09 DIAGNOSIS — M95.8 OTHER SPECIFIED ACQUIRED DEFORMITIES OF MUSCULOSKELETAL SYSTEM: ICD-10-CM

## 2024-03-09 PROCEDURE — 73721 MRI JNT OF LWR EXTRE W/O DYE: CPT | Mod: 26,RT

## 2024-03-09 PROCEDURE — 73721 MRI JNT OF LWR EXTRE W/O DYE: CPT

## 2024-03-14 ENCOUNTER — APPOINTMENT (OUTPATIENT)
Dept: ORTHOPEDIC SURGERY | Facility: CLINIC | Age: 24
End: 2024-03-14
Payer: MEDICAID

## 2024-03-14 DIAGNOSIS — M24.171 OTHER ARTICULAR CARTILAGE DISORDERS, RIGHT ANKLE: ICD-10-CM

## 2024-03-14 DIAGNOSIS — S96.911S STRAIN OF UNSPECIFIED MUSCLE AND TENDON AT ANKLE AND FOOT LEVEL, RIGHT FOOT, SEQUELA: ICD-10-CM

## 2024-03-14 DIAGNOSIS — M95.8 OTHER SPECIFIED ACQUIRED DEFORMITIES OF MUSCULOSKELETAL SYSTEM: ICD-10-CM

## 2024-03-14 PROCEDURE — 77071 MNL APPL STRS JT RADIOGRAPHY: CPT

## 2024-03-14 PROCEDURE — 73600 X-RAY EXAM OF ANKLE: CPT | Mod: RT

## 2024-03-14 PROCEDURE — 99215 OFFICE O/P EST HI 40 MIN: CPT

## 2024-12-27 ENCOUNTER — APPOINTMENT (OUTPATIENT)
Dept: ORTHOPEDIC SURGERY | Facility: CLINIC | Age: 24
End: 2024-12-27
Payer: MEDICAID

## 2024-12-27 VITALS — BODY MASS INDEX: 37.93 KG/M2 | WEIGHT: 280 LBS | HEIGHT: 72 IN

## 2024-12-27 DIAGNOSIS — M75.20 BICIPITAL TENDINITIS, UNSPECIFIED SHOULDER: ICD-10-CM

## 2024-12-27 DIAGNOSIS — M75.21 BICIPITAL TENDINITIS, RIGHT SHOULDER: ICD-10-CM

## 2024-12-27 PROCEDURE — 99213 OFFICE O/P EST LOW 20 MIN: CPT

## 2024-12-27 RX ORDER — MELOXICAM 15 MG/1
15 TABLET ORAL
Qty: 30 | Refills: 0 | Status: ACTIVE | COMMUNITY
Start: 2024-12-27 | End: 1900-01-01

## 2025-01-24 ENCOUNTER — APPOINTMENT (OUTPATIENT)
Dept: ORTHOPEDIC SURGERY | Facility: CLINIC | Age: 25
End: 2025-01-24

## 2025-01-31 ENCOUNTER — APPOINTMENT (OUTPATIENT)
Dept: ORTHOPEDIC SURGERY | Facility: CLINIC | Age: 25
End: 2025-01-31

## 2025-04-09 ENCOUNTER — APPOINTMENT (OUTPATIENT)
Dept: ORTHOPEDIC SURGERY | Facility: CLINIC | Age: 25
End: 2025-04-09
Payer: MEDICAID

## 2025-04-09 VITALS — WEIGHT: 280 LBS | HEIGHT: 72 IN | BODY MASS INDEX: 37.93 KG/M2

## 2025-04-09 DIAGNOSIS — M75.21 BICIPITAL TENDINITIS, RIGHT SHOULDER: ICD-10-CM

## 2025-04-09 DIAGNOSIS — M25.521 PAIN IN RIGHT ELBOW: ICD-10-CM

## 2025-04-09 PROCEDURE — 99213 OFFICE O/P EST LOW 20 MIN: CPT

## (undated) DEVICE — DISSECTOR ENDOSCOPIC KITTNER SINGLE TIP

## (undated) DEVICE — CANISTER DISPOSABLE THIN WALL 3000CC

## (undated) DEVICE — PROTECTOR HEEL / ELBOW FLUFFY

## (undated) DEVICE — SOL INJ NS 0.9% 1000ML

## (undated) DEVICE — TIP METZENBAUM SCISSOR MONOPOLAR ENDOCUT (ORANGE)

## (undated) DEVICE — SOL IRR POUR H2O 500ML

## (undated) DEVICE — FOLEY TRAY 16FR 5CC LF UMETER CLOSED

## (undated) DEVICE — PACK GENERAL LAPAROSCOPY

## (undated) DEVICE — SUT MONOCRYL 4-0 27" PS-2 UNDYED

## (undated) DEVICE — Device

## (undated) DEVICE — DRSG BENZOIN 0.6CC

## (undated) DEVICE — D HELP - CLEARVIEW CLEARIFY SYSTEM

## (undated) DEVICE — SOL IRR POUR NS 0.9% 500ML

## (undated) DEVICE — ENDOCATCH 10MM SPECIMEN POUCH

## (undated) DEVICE — BASIN SET SINGLE

## (undated) DEVICE — TUBING OLYMPUS INSUFFLATION

## (undated) DEVICE — BLADE SURGICAL #15 CARBON

## (undated) DEVICE — POSITIONER STRAP ARMBOARD VELCRO TS-30

## (undated) DEVICE — LIGASURE BLUNT TIP 37CM

## (undated) DEVICE — TROCAR APPLIED MEDICAL KII BALLOON BLUNT TIP 12MM X 130MM

## (undated) DEVICE — TROCAR COVIDIEN VERSAONE FIXATION CANNULA 5MM

## (undated) DEVICE — TUBING INSUFFLATION LAP FILTER 10FT

## (undated) DEVICE — ELCTR GROUNDING PAD ADULT COVIDIEN

## (undated) DEVICE — TUBING HYDRO-SURG PLUS IRRIGATOR W SMOKEVAC & PROBE

## (undated) DEVICE — ELCTR BOVIE TIP BLADE INSULATED 2.75" EDGE

## (undated) DEVICE — DRAPE TOWEL BLUE STICKY

## (undated) DEVICE — LIGASURE MARYLAND 37CM

## (undated) DEVICE — DRAIN RESERVOIR FOR JACKSON PRATT 100CC CARDINAL

## (undated) DEVICE — SUT VICRYL 0 27" UR-6

## (undated) DEVICE — DRAIN JACKSON PRATT 7MM FLAT FULL NO TROCAR

## (undated) DEVICE — VENODYNE/SCD SLEEVE CALF MEDIUM

## (undated) DEVICE — GLV 6.5 PROTEXIS W HYDROGEL

## (undated) DEVICE — TROCAR COVIDIEN VERSAPORT BLADELESS OPTICAL 5MM STANDARD

## (undated) DEVICE — DRSG STERISTRIPS 0.5 X 4"

## (undated) DEVICE — TROCAR COVIDIEN BLUNT TIP HASSAN 12MM

## (undated) DEVICE — WARMING BLANKET FULL ADULT

## (undated) DEVICE — STAPLER COVIDIEN ENDO GIA STANDARD HANDLE